# Patient Record
Sex: MALE | Race: WHITE | NOT HISPANIC OR LATINO | URBAN - METROPOLITAN AREA
[De-identification: names, ages, dates, MRNs, and addresses within clinical notes are randomized per-mention and may not be internally consistent; named-entity substitution may affect disease eponyms.]

---

## 2017-01-26 ENCOUNTER — EMERGENCY (EMERGENCY)
Facility: HOSPITAL | Age: 82
LOS: 0 days | Discharge: HOME | End: 2017-01-26
Admitting: EMERGENCY MEDICINE
Payer: COMMERCIAL

## 2017-01-26 DIAGNOSIS — S01.512A LACERATION WITHOUT FOREIGN BODY OF ORAL CAVITY, INITIAL ENCOUNTER: ICD-10-CM

## 2017-01-26 DIAGNOSIS — Z79.01 LONG TERM (CURRENT) USE OF ANTICOAGULANTS: ICD-10-CM
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
## 2017-01-26 DIAGNOSIS — J44.9 CHRONIC OBSTRUCTIVE PULMONARY DISEASE, UNSPECIFIED: ICD-10-CM

## 2017-01-26 DIAGNOSIS — I48.91 UNSPECIFIED ATRIAL FIBRILLATION: ICD-10-CM
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
## 2017-01-26 PROCEDURE — 99283 EMERGENCY DEPT VISIT LOW MDM: CPT

## 2017-04-20 ENCOUNTER — APPOINTMENT (OUTPATIENT)
Dept: PULMONOLOGY | Facility: CLINIC | Age: 82
End: 2017-04-20

## 2018-04-30 ENCOUNTER — APPOINTMENT (OUTPATIENT)
Dept: UROLOGY | Facility: CLINIC | Age: 83
End: 2018-04-30
Payer: MEDICARE

## 2018-04-30 VITALS
OXYGEN SATURATION: 98 % | DIASTOLIC BLOOD PRESSURE: 69 MMHG | WEIGHT: 185 LBS | HEIGHT: 76 IN | SYSTOLIC BLOOD PRESSURE: 152 MMHG | BODY MASS INDEX: 22.53 KG/M2 | TEMPERATURE: 97.5 F | HEART RATE: 66 BPM

## 2018-04-30 DIAGNOSIS — R35.0 FREQUENCY OF MICTURITION: ICD-10-CM

## 2018-04-30 DIAGNOSIS — K40.90 UNILATERAL INGUINAL HERNIA, W/OUT OBSTRUCTION OR GANGRENE, NOT SPECIFIED AS RECURRENT: ICD-10-CM

## 2018-04-30 DIAGNOSIS — N13.8 BENIGN PROSTATIC HYPERPLASIA WITH LOWER URINARY TRACT SYMPMS: ICD-10-CM

## 2018-04-30 DIAGNOSIS — N40.1 BENIGN PROSTATIC HYPERPLASIA WITH LOWER URINARY TRACT SYMPMS: ICD-10-CM

## 2018-04-30 PROCEDURE — 99213 OFFICE O/P EST LOW 20 MIN: CPT

## 2018-05-09 ENCOUNTER — APPOINTMENT (OUTPATIENT)
Dept: SURGERY | Facility: CLINIC | Age: 83
End: 2018-05-09
Payer: MEDICARE

## 2018-05-09 PROCEDURE — 99202 OFFICE O/P NEW SF 15 MIN: CPT

## 2018-05-30 ENCOUNTER — APPOINTMENT (OUTPATIENT)
Dept: DERMATOLOGY | Facility: CLINIC | Age: 83
End: 2018-05-30
Payer: MEDICARE

## 2018-05-30 DIAGNOSIS — Z86.79 PERSONAL HISTORY OF OTHER DISEASES OF THE CIRCULATORY SYSTEM: ICD-10-CM

## 2018-05-30 DIAGNOSIS — I87.2 VENOUS INSUFFICIENCY (CHRONIC) (PERIPHERAL): ICD-10-CM

## 2018-05-30 PROCEDURE — 99203 OFFICE O/P NEW LOW 30 MIN: CPT

## 2018-05-30 RX ORDER — FUROSEMIDE 40 MG/1
40 TABLET ORAL
Refills: 0 | Status: ACTIVE | COMMUNITY

## 2018-05-30 RX ORDER — SILODOSIN 8 MG/1
8 CAPSULE ORAL
Refills: 0 | Status: ACTIVE | COMMUNITY

## 2018-05-30 RX ORDER — LEVOFLOXACIN 250 MG/1
250 TABLET, FILM COATED ORAL
Refills: 0 | Status: ACTIVE | COMMUNITY

## 2018-05-30 RX ORDER — WARFARIN SODIUM 5 MG/1
5 TABLET ORAL
Refills: 0 | Status: ACTIVE | COMMUNITY

## 2018-05-30 RX ORDER — MONTELUKAST 10 MG/1
10 TABLET, FILM COATED ORAL
Refills: 0 | Status: ACTIVE | COMMUNITY

## 2018-05-30 RX ORDER — PREDNISONE 5 MG/1
5 TABLET ORAL
Refills: 0 | Status: ACTIVE | COMMUNITY

## 2018-06-18 ENCOUNTER — APPOINTMENT (OUTPATIENT)
Dept: DERMATOLOGY | Facility: CLINIC | Age: 83
End: 2018-06-18
Payer: MEDICARE

## 2018-06-18 DIAGNOSIS — I83.009 VARICOSE VEINS OF UNSPECIFIED LOWER EXTREMITY WITH ULCER OF UNSPECIFIED SITE: ICD-10-CM

## 2018-06-18 DIAGNOSIS — L97.909 VARICOSE VEINS OF UNSPECIFIED LOWER EXTREMITY WITH ULCER OF UNSPECIFIED SITE: ICD-10-CM

## 2018-06-18 PROCEDURE — 99213 OFFICE O/P EST LOW 20 MIN: CPT

## 2018-07-30 ENCOUNTER — APPOINTMENT (OUTPATIENT)
Dept: DERMATOLOGY | Facility: CLINIC | Age: 83
End: 2018-07-30

## 2018-07-31 ENCOUNTER — INPATIENT (INPATIENT)
Facility: HOSPITAL | Age: 83
LOS: 2 days | Discharge: NURSING, REHAB OR LONG-TERM CARE | End: 2018-08-03
Attending: SURGERY | Admitting: SURGERY
Payer: COMMERCIAL

## 2018-07-31 VITALS — WEIGHT: 184.97 LBS

## 2018-07-31 DIAGNOSIS — I50.30 UNSPECIFIED DIASTOLIC (CONGESTIVE) HEART FAILURE: ICD-10-CM

## 2018-07-31 DIAGNOSIS — Z98.49 CATARACT EXTRACTION STATUS, UNSPECIFIED EYE: Chronic | ICD-10-CM
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
## 2018-07-31 DIAGNOSIS — J44.9 CHRONIC OBSTRUCTIVE PULMONARY DISEASE, UNSPECIFIED: ICD-10-CM

## 2018-07-31 DIAGNOSIS — D64.9 ANEMIA, UNSPECIFIED: ICD-10-CM

## 2018-07-31 DIAGNOSIS — I10 ESSENTIAL (PRIMARY) HYPERTENSION: ICD-10-CM

## 2018-07-31 DIAGNOSIS — I48.91 UNSPECIFIED ATRIAL FIBRILLATION: ICD-10-CM

## 2018-07-31 LAB
ALBUMIN SERPL ELPH-MCNC: 2.6 G/DL — LOW (ref 3.3–5)
ALP SERPL-CCNC: 93 U/L — SIGNIFICANT CHANGE UP (ref 40–120)
ALT FLD-CCNC: 29 U/L — SIGNIFICANT CHANGE UP (ref 12–78)
ANION GAP SERPL CALC-SCNC: 7 MMOL/L — SIGNIFICANT CHANGE UP (ref 5–17)
ANISOCYTOSIS BLD QL: SIGNIFICANT CHANGE UP
APTT BLD: 29.8 SEC — SIGNIFICANT CHANGE UP (ref 27.5–37.4)
AST SERPL-CCNC: 24 U/L — SIGNIFICANT CHANGE UP (ref 15–37)
BASOPHILS # BLD AUTO: 0.07 K/UL — SIGNIFICANT CHANGE UP (ref 0–0.2)
BASOPHILS NFR BLD AUTO: 0.3 % — SIGNIFICANT CHANGE UP (ref 0–2)
BILIRUB SERPL-MCNC: 0.7 MG/DL — SIGNIFICANT CHANGE UP (ref 0.2–1.2)
BUN SERPL-MCNC: 50 MG/DL — HIGH (ref 7–23)
CALCIUM SERPL-MCNC: 8.4 MG/DL — LOW (ref 8.5–10.1)
CHLORIDE SERPL-SCNC: 106 MMOL/L — SIGNIFICANT CHANGE UP (ref 96–108)
CK SERPL-CCNC: 67 U/L — SIGNIFICANT CHANGE UP (ref 26–308)
CO2 SERPL-SCNC: 29 MMOL/L — SIGNIFICANT CHANGE UP (ref 22–31)
CREAT SERPL-MCNC: 1.78 MG/DL — HIGH (ref 0.5–1.3)
DACRYOCYTES BLD QL SMEAR: SLIGHT — SIGNIFICANT CHANGE UP
ELLIPTOCYTES BLD QL SMEAR: SIGNIFICANT CHANGE UP
EOSINOPHIL # BLD AUTO: 0.23 K/UL — SIGNIFICANT CHANGE UP (ref 0–0.5)
EOSINOPHIL NFR BLD AUTO: 1.1 % — SIGNIFICANT CHANGE UP (ref 0–6)
GLUCOSE SERPL-MCNC: 204 MG/DL — HIGH (ref 70–99)
HCT VFR BLD CALC: 28.5 % — LOW (ref 39–50)
HGB BLD-MCNC: 8.1 G/DL — LOW (ref 13–17)
HYPOCHROMIA BLD QL: SLIGHT — SIGNIFICANT CHANGE UP
IMM GRANULOCYTES NFR BLD AUTO: 1.4 % — SIGNIFICANT CHANGE UP (ref 0–1.5)
INR BLD: 1.61 RATIO — HIGH (ref 0.88–1.16)
LDH SERPL L TO P-CCNC: 298 U/L — HIGH (ref 84–241)
LYMPHOCYTES # BLD AUTO: 0.76 K/UL — LOW (ref 1–3.3)
LYMPHOCYTES # BLD AUTO: 3.6 % — LOW (ref 13–44)
MACROCYTES BLD QL: SLIGHT — SIGNIFICANT CHANGE UP
MANUAL SMEAR VERIFICATION: SIGNIFICANT CHANGE UP
MCHC RBC-ENTMCNC: 22.1 PG — LOW (ref 27–34)
MCHC RBC-ENTMCNC: 28.4 GM/DL — LOW (ref 32–36)
MCV RBC AUTO: 77.9 FL — LOW (ref 80–100)
MICROCYTES BLD QL: SIGNIFICANT CHANGE UP
MONOCYTES # BLD AUTO: 1.64 K/UL — HIGH (ref 0–0.9)
MONOCYTES NFR BLD AUTO: 7.8 % — SIGNIFICANT CHANGE UP (ref 2–14)
NEUTROPHILS # BLD AUTO: 18.01 K/UL — HIGH (ref 1.8–7.4)
NEUTROPHILS NFR BLD AUTO: 85.8 % — HIGH (ref 43–77)
NRBC # BLD: 0 /100 WBCS — SIGNIFICANT CHANGE UP (ref 0–0)
NT-PROBNP SERPL-SCNC: 3903 PG/ML — HIGH (ref 0–450)
PLAT MORPH BLD: NORMAL — SIGNIFICANT CHANGE UP
PLATELET # BLD AUTO: 600 K/UL — HIGH (ref 150–400)
POIKILOCYTOSIS BLD QL AUTO: SIGNIFICANT CHANGE UP
POTASSIUM SERPL-MCNC: 4.1 MMOL/L — SIGNIFICANT CHANGE UP (ref 3.5–5.3)
POTASSIUM SERPL-SCNC: 4.1 MMOL/L — SIGNIFICANT CHANGE UP (ref 3.5–5.3)
PROT SERPL-MCNC: 5.9 GM/DL — LOW (ref 6–8.3)
PROTHROM AB SERPL-ACNC: 17.6 SEC — HIGH (ref 9.8–12.7)
RBC # BLD: 3.66 M/UL — LOW (ref 4.2–5.8)
RBC # FLD: 18.9 % — HIGH (ref 10.3–14.5)
RBC BLD AUTO: ABNORMAL
SODIUM SERPL-SCNC: 142 MMOL/L — SIGNIFICANT CHANGE UP (ref 135–145)
TROPONIN I SERPL-MCNC: 0.03 NG/ML — SIGNIFICANT CHANGE UP (ref 0.01–0.04)
WBC # BLD: 21 K/UL — HIGH (ref 3.8–10.5)
WBC # FLD AUTO: 21 K/UL — HIGH (ref 3.8–10.5)

## 2018-07-31 PROCEDURE — 70450 CT HEAD/BRAIN W/O DYE: CPT | Mod: 26

## 2018-07-31 PROCEDURE — 74177 CT ABD & PELVIS W/CONTRAST: CPT | Mod: 26

## 2018-07-31 PROCEDURE — 99285 EMERGENCY DEPT VISIT HI MDM: CPT

## 2018-07-31 PROCEDURE — 73080 X-RAY EXAM OF ELBOW: CPT | Mod: 26,LT

## 2018-07-31 PROCEDURE — 73090 X-RAY EXAM OF FOREARM: CPT | Mod: 26,RT,76

## 2018-07-31 PROCEDURE — 93010 ELECTROCARDIOGRAM REPORT: CPT

## 2018-07-31 PROCEDURE — 72125 CT NECK SPINE W/O DYE: CPT | Mod: 26

## 2018-07-31 PROCEDURE — 99223 1ST HOSP IP/OBS HIGH 75: CPT

## 2018-07-31 PROCEDURE — 73060 X-RAY EXAM OF HUMERUS: CPT | Mod: 26,RT

## 2018-07-31 PROCEDURE — 73030 X-RAY EXAM OF SHOULDER: CPT | Mod: 26,RT

## 2018-07-31 PROCEDURE — 71260 CT THORAX DX C+: CPT | Mod: 26

## 2018-07-31 RX ORDER — PANTOPRAZOLE SODIUM 20 MG/1
40 TABLET, DELAYED RELEASE ORAL
Qty: 0 | Refills: 0 | Status: DISCONTINUED | OUTPATIENT
Start: 2018-07-31 | End: 2018-08-03

## 2018-07-31 RX ORDER — MORPHINE SULFATE 50 MG/1
4 CAPSULE, EXTENDED RELEASE ORAL ONCE
Qty: 0 | Refills: 0 | Status: DISCONTINUED | OUTPATIENT
Start: 2018-07-31 | End: 2018-07-31

## 2018-07-31 RX ORDER — LEVOTHYROXINE SODIUM 125 MCG
200 TABLET ORAL DAILY
Qty: 0 | Refills: 0 | Status: DISCONTINUED | OUTPATIENT
Start: 2018-07-31 | End: 2018-08-03

## 2018-07-31 RX ORDER — ACETAMINOPHEN 500 MG
650 TABLET ORAL EVERY 6 HOURS
Qty: 0 | Refills: 0 | Status: DISCONTINUED | OUTPATIENT
Start: 2018-07-31 | End: 2018-08-03

## 2018-07-31 RX ORDER — CEFTRIAXONE 500 MG/1
1000 INJECTION, POWDER, FOR SOLUTION INTRAMUSCULAR; INTRAVENOUS ONCE
Qty: 0 | Refills: 0 | Status: COMPLETED | OUTPATIENT
Start: 2018-07-31 | End: 2018-07-31

## 2018-07-31 RX ORDER — LIDOCAINE 4 G/100G
1 CREAM TOPICAL DAILY
Qty: 0 | Refills: 0 | Status: DISCONTINUED | OUTPATIENT
Start: 2018-07-31 | End: 2018-08-03

## 2018-07-31 RX ORDER — SODIUM CHLORIDE 9 MG/ML
500 INJECTION INTRAMUSCULAR; INTRAVENOUS; SUBCUTANEOUS ONCE
Qty: 0 | Refills: 0 | Status: COMPLETED | OUTPATIENT
Start: 2018-07-31 | End: 2018-07-31

## 2018-07-31 RX ORDER — ONDANSETRON 8 MG/1
4 TABLET, FILM COATED ORAL ONCE
Qty: 0 | Refills: 0 | Status: COMPLETED | OUTPATIENT
Start: 2018-07-31 | End: 2018-07-31

## 2018-07-31 RX ORDER — OXYCODONE HYDROCHLORIDE 5 MG/1
5 TABLET ORAL EVERY 6 HOURS
Qty: 0 | Refills: 0 | Status: DISCONTINUED | OUTPATIENT
Start: 2018-07-31 | End: 2018-08-02

## 2018-07-31 RX ORDER — ALBUTEROL 90 UG/1
2.5 AEROSOL, METERED ORAL EVERY 6 HOURS
Qty: 0 | Refills: 0 | Status: DISCONTINUED | OUTPATIENT
Start: 2018-07-31 | End: 2018-08-03

## 2018-07-31 RX ORDER — ASPIRIN/CALCIUM CARB/MAGNESIUM 324 MG
1 TABLET ORAL
Qty: 0 | Refills: 0 | COMMUNITY

## 2018-07-31 RX ORDER — HYDROMORPHONE HYDROCHLORIDE 2 MG/ML
0.5 INJECTION INTRAMUSCULAR; INTRAVENOUS; SUBCUTANEOUS EVERY 4 HOURS
Qty: 0 | Refills: 0 | Status: DISCONTINUED | OUTPATIENT
Start: 2018-07-31 | End: 2018-08-02

## 2018-07-31 RX ORDER — HEPARIN SODIUM 5000 [USP'U]/ML
5000 INJECTION INTRAVENOUS; SUBCUTANEOUS EVERY 12 HOURS
Qty: 0 | Refills: 0 | Status: DISCONTINUED | OUTPATIENT
Start: 2018-07-31 | End: 2018-08-02

## 2018-07-31 RX ORDER — WARFARIN SODIUM 2.5 MG/1
5 TABLET ORAL ONCE
Qty: 0 | Refills: 0 | Status: COMPLETED | OUTPATIENT
Start: 2018-07-31 | End: 2018-07-31

## 2018-07-31 RX ORDER — ONDANSETRON 8 MG/1
4 TABLET, FILM COATED ORAL EVERY 6 HOURS
Qty: 0 | Refills: 0 | Status: DISCONTINUED | OUTPATIENT
Start: 2018-07-31 | End: 2018-08-03

## 2018-07-31 RX ORDER — ALBUTEROL 90 UG/1
2.5 AEROSOL, METERED ORAL
Qty: 0 | Refills: 0 | Status: DISCONTINUED | OUTPATIENT
Start: 2018-07-31 | End: 2018-08-03

## 2018-07-31 RX ORDER — AMLODIPINE BESYLATE 2.5 MG/1
10 TABLET ORAL DAILY
Qty: 0 | Refills: 0 | Status: DISCONTINUED | OUTPATIENT
Start: 2018-07-31 | End: 2018-08-03

## 2018-07-31 RX ORDER — FUROSEMIDE 40 MG
40 TABLET ORAL DAILY
Qty: 0 | Refills: 0 | Status: DISCONTINUED | OUTPATIENT
Start: 2018-08-01 | End: 2018-08-01

## 2018-07-31 RX ORDER — FUROSEMIDE 40 MG
20 TABLET ORAL ONCE
Qty: 0 | Refills: 0 | Status: COMPLETED | OUTPATIENT
Start: 2018-07-31 | End: 2018-07-31

## 2018-07-31 RX ADMIN — SODIUM CHLORIDE 500 MILLILITER(S): 9 INJECTION INTRAMUSCULAR; INTRAVENOUS; SUBCUTANEOUS at 13:56

## 2018-07-31 RX ADMIN — MORPHINE SULFATE 4 MILLIGRAM(S): 50 CAPSULE, EXTENDED RELEASE ORAL at 12:15

## 2018-07-31 RX ADMIN — Medication 20 MILLIGRAM(S): at 18:22

## 2018-07-31 RX ADMIN — MORPHINE SULFATE 4 MILLIGRAM(S): 50 CAPSULE, EXTENDED RELEASE ORAL at 13:56

## 2018-07-31 RX ADMIN — HYDROMORPHONE HYDROCHLORIDE 0.5 MILLIGRAM(S): 2 INJECTION INTRAMUSCULAR; INTRAVENOUS; SUBCUTANEOUS at 17:00

## 2018-07-31 RX ADMIN — MORPHINE SULFATE 4 MILLIGRAM(S): 50 CAPSULE, EXTENDED RELEASE ORAL at 12:00

## 2018-07-31 RX ADMIN — ONDANSETRON 4 MILLIGRAM(S): 8 TABLET, FILM COATED ORAL at 12:00

## 2018-07-31 RX ADMIN — MORPHINE SULFATE 4 MILLIGRAM(S): 50 CAPSULE, EXTENDED RELEASE ORAL at 14:15

## 2018-07-31 RX ADMIN — Medication 650 MILLIGRAM(S): at 20:09

## 2018-07-31 RX ADMIN — SODIUM CHLORIDE 500 MILLILITER(S): 9 INJECTION INTRAMUSCULAR; INTRAVENOUS; SUBCUTANEOUS at 12:01

## 2018-07-31 RX ADMIN — OXYCODONE HYDROCHLORIDE 5 MILLIGRAM(S): 5 TABLET ORAL at 20:10

## 2018-07-31 RX ADMIN — HYDROMORPHONE HYDROCHLORIDE 0.5 MILLIGRAM(S): 2 INJECTION INTRAMUSCULAR; INTRAVENOUS; SUBCUTANEOUS at 16:45

## 2018-07-31 RX ADMIN — WARFARIN SODIUM 5 MILLIGRAM(S): 2.5 TABLET ORAL at 22:18

## 2018-07-31 RX ADMIN — CEFTRIAXONE 1000 MILLIGRAM(S): 500 INJECTION, POWDER, FOR SOLUTION INTRAMUSCULAR; INTRAVENOUS at 16:35

## 2018-07-31 RX ADMIN — PANTOPRAZOLE SODIUM 40 MILLIGRAM(S): 20 TABLET, DELAYED RELEASE ORAL at 19:22

## 2018-07-31 RX ADMIN — OXYCODONE HYDROCHLORIDE 5 MILLIGRAM(S): 5 TABLET ORAL at 20:40

## 2018-07-31 NOTE — ED ADULT NURSE NOTE - NSIMPLEMENTINTERV_GEN_ALL_ED
Implemented All Fall with Harm Risk Interventions:  [place] to call system. Call bell, personal items and telephone within reach. Instruct patient to call for assistance. Room bathroom lighting operational. Non-slip footwear when patient is off stretcher. Physically safe environment: no spills, clutter or unnecessary equipment. Stretcher in lowest position, wheels locked, appropriate side rails in place. Provide visual cue, wrist band, yellow gown, etc. Monitor gait and stability. Monitor for mental status changes and reorient to person, place, and time. Review medications for side effects contributing to fall risk. Reinforce activity limits and safety measures with patient and family. Provide visual clues: red socks.

## 2018-07-31 NOTE — H&P ADULT - NSHPLABSRESULTS_GEN_ALL_CORE
Vital Signs Last 24 Hrs  T(C): 36.4 (31 Jul 2018 10:41), Max: 36.4 (31 Jul 2018 10:41)  T(F): 97.5 (31 Jul 2018 10:41), Max: 97.5 (31 Jul 2018 10:41)  HR: 92 (31 Jul 2018 10:41) (92 - 92)  BP: 134/96 (31 Jul 2018 10:41) (134/96 - 134/96)  BP(mean): --  RR: 17 (31 Jul 2018 10:41) (17 - 17)  SpO2: 100% (31 Jul 2018 10:41) (100% - 100%)    Labs:  CARDIAC MARKERS ( 31 Jul 2018 11:50 )  0.026 ng/mL / x     / 67 U/L / x     / x                           8.1    21.00 )-----------( 600      ( 31 Jul 2018 11:50 )             28.5   CBC Full  -  ( 31 Jul 2018 11:50 )  WBC Count : 21.00 K/uL  Hemoglobin : 8.1 g/dL  Hematocrit : 28.5 %  Platelet Count - Automated : 600 K/uL  Mean Cell Volume : 77.9 fl  Mean Cell Hemoglobin : 22.1 pg  Mean Cell Hemoglobin Concentration : 28.4 gm/dL  Auto Neutrophil # : 18.01 K/uL  Auto Lymphocyte # : 0.76 K/uL  Auto Monocyte # : 1.64 K/uL  Auto Eosinophil # : 0.23 K/uL  Auto Basophil # : 0.07 K/uL  Auto Neutrophil % : 85.8 %  Auto Lymphocyte % : 3.6 %  Auto Monocyte % : 7.8 %  Auto Eosinophil % : 1.1 %  Auto Basophil % : 0.3 %  142  |  106  |  50<H>  ----------------------------<  204<H>  4.1   |  29  |  1.78<H>  Ca    8.4<L>      31 Jul 2018 11:50  TPro  5.9<L>  /  Alb  2.6<L>  /  TBili  0.7  /  DBili  x   /  AST  24  /  ALT  29  /  AlkPhos  93  07-31  LIVER FUNCTIONS - ( 31 Jul 2018 11:50 )  Alb: 2.6 g/dL / Pro: 5.9 gm/dL / ALK PHOS: 93 U/L / ALT: 29 U/L / AST: 24 U/L / GGT: x         PT/INR - ( 31 Jul 2018 11:50 )   PT: 17.6 sec;   INR: 1.61 ratio    PTT - ( 31 Jul 2018 11:50 )  PTT:29.8 sec    Radiology:    EXAM:  CT ABDOMEN AND PELVIS IC                        EXAM:  CT CHEST IC                       *** ADDENDUM 07/31/2018  ***  There is a left inguinal hernia containing nonobstructed sigmoid colon.   Colonic diverticulosis is noted, without diverticulitis.  *** END OF ADDENDUM 07/31/2018  ***  PROCEDURE DATE:  07/31/2018    INTERPRETATION:  Clinical information: Status post fall 2 days ago with   back pain. On Coumadin.  COMPARISON: None  IMPRESSION:   Acute fractures of the L4 vertebral body and right L5 transverse process.   Suspicion for nondisplaced right fourth rib fracture.  These findings   were discussed with LAURIE Zheng on 7.31.18, 1430 hours.  Small loculated right pleural effusion. Partial right lower lobe   atelectasis.  Mild pulmonary edema.  1.4 cm area of focal gallbladder wall thickening could be due to   adenomyomatosis versus neoplasm.  ***Please see the addendum at the top of this report. It may contain   additional important information or changes.****      EXAM:  CT CERVICAL SPINE                        PROCEDURE DATE:  07/31/2018    INTERPRETATION:    CT cervical spine without IV contrast      IMPRESSION:   No vertebral fracture is recognized.  Multilevel   degenerative disc disease and spondylosis at C2-3 through C7-T1   withnarrowing of the LEFT C2-3, LEFT C3-4, BILATERAL C4-5, C5-6 and RIGHT   C6-7 neural foramina due to uncovertebral spurring and facet osteophytic   hypertrophy. Degenerative cord impingement is seen at C4-5.      EXAM:  CT BRAIN                        PROCEDURE DATE:  07/31/2018    INTERPRETATION:    CT head without IV contrast      CLINICAL INFORMATION:  Fall 2 days ago   Intracranial hemorrhage.  IMPRESSION:   Mild periventricular white matter ischemia.               EXAM:  FOREARM-ULNA & RADIUS-RIGHT                        PROCEDURE DATE:  07/31/2018    IMPRESSION:  No acute fracture or dislocation.    EXAM:  SHOULDER COMP  MIN 2 VIEWS-RT                        EXAM:  HUMERUS-RIGHT                        PROCEDURE DATE:  07/31/2018    IMPRESSION:  No acute fracture or dislocation.

## 2018-07-31 NOTE — ED STATDOCS - NS_ ATTENDINGSCRIBEDETAILS _ED_A_ED_FT
I, Jose Rafael Peters DO, performed the initial face to face bedside interview with this patient regarding history of present illness and determined that the patient should be seen in the main ED.  The history, was documented by the scribe in my presence and I attest to the accuracy of the documentation.

## 2018-07-31 NOTE — ED STATDOCS - PROGRESS NOTE DETAILS
Juana ANGEL for ED attending, Dr. Peters: 89 y/o M with a PMHx of A-Fib (on Coumadin), HTN, COPD, Hernia, Hypothyroidism, and Kidney Disease presenting to the ED with son s/p fall two nights ago. Pt fell onto floor and was laying on floor for four hours. Reports that he could not reach his Med Alert band to call for help. Denies LOC. Presents with abrasions to arm and head with bruising. C/o back pain raditing to right shoulder and inability to ambulate. Denies confusion and son states pt is acting at baseline. No CP. Pt did not take Coumadin last night. Today is first day pt has sought medical care s/p fall. No hx of DM. PMD: Dr. Burgess. Will send patient to main ED for further evaluation.

## 2018-07-31 NOTE — H&P ADULT - NSHPPHYSICALEXAM_GEN_ALL_CORE
GCS of 15  Airway is patent  Breathing is symmetric and unlabored  CNII-XII grossly intact  HEENT: Normocephalic, atraumatic, SPENCER, EOM wnl, no otorrhea or hemotympanum b/l, no epistaxis or d/c b/l nares, (+) left frontal scalp ecchymosis, no craniofacial bony pathology or tenderness b/l otherwise  Neck: Soft and supple, nontender to passive/active ROM exam. No crepitus, no ecchymosis, no hematoma, to exam, no JVD, no tracheal deviation  Cspine/thoracolumbrosacral spine: (+) L4-L5 tenderness from known fracture pathology, no gross bony pathology or tenderness to exam otherwise  Cardiovascular: S1S2 Present  Chest: no gross left rib pathology or tenderness to exam. (+) tenderness to right 4th rib region from known fracture pathology. No sternal pathology or tenderness to exam. No crepitus, no ecchymosis, no hematoma. No penetrating thorcoabdominal trauma  Respiratory: Rate is 18; Respiratory Effort normal; no wheezes, rales or rhonchi to exam  ABD: bowel sounds (+), soft, nontender, non distended, no rebound, no guarding, no rigidity, no skin changes to exam. No pelvic instability to exam, no skin changes  Genitourinary: No scrotal/perineal/perirectal hematoma/ecchymosis/tenderness to exam. (+) left inguinal hernia, reducible, nontender  External genitalia: normal, no blood at urethral meatus  Musculoskeletal: Pt has palpable b/l radial, femoral, dorsalis pedis pulses. All digits are warm and well perfused. No gross long bone pathology or tenderness to exam. Pt demonstrates grossly intact sensoromotor function. Pt has good capillary refill to digits, 4+ pitting edema to lower leg exam.  Skin: (+) ecchymosis to right upper extremity, right chest wall, right flank region, no lesions or rashes to exam otherwise

## 2018-07-31 NOTE — ED PROVIDER NOTE - SKIN, MLM
diffuse ecchymosis R arm. skin tear on lateral L forearm and elbow 8cm long 3cm wide. periorbital ecchymosis on L eye. L frontal bruising

## 2018-07-31 NOTE — ED STATDOCS - PMH
Atrial fibrillation    Diastolic heart failure    Hypertension    Hypothyroidism    Kidney disease  since 1 year  Occult GI bleeding

## 2018-07-31 NOTE — H&P ADULT - HISTORY OF PRESENT ILLNESS
Trauma Consult    Pt s.p mechanical fall from standing height at home where he lives alone, on 7/29/18. Pt remained on the floor for several hours prior to being assisted by his son. Pt did not seek medical attention until 7/31/18. Pt found to have lumbar fractures, right rib fracture, correlating to region of pain complaints    Pt seen and examined at bedside with chaperone. Pt is AAOx3, pt in no acute distress. Pt denied c/o fever, chills, chest pain, SOB, abd pain, N/V/D, extremity pain or dysfunction, hemoptysis, hematemesis, hematuria, hematochexia, headache, diplopia, vertigo, dizzyness. Pt stated tolerating diet, (+) void, (+) ambulation, (+) bowel function

## 2018-07-31 NOTE — CONSULT NOTE ADULT - SUBJECTIVE AND OBJECTIVE BOX
PCP- DR Aditya Small    CC- s/p mechanical fall 2 days ago    HPI:  89yo/M with PMH afib on coumadin, CKD with unknown baseline creatinine, chr diastolic CHF, COPD on chronic prednisone, BPH presented as trauma after he sustained fall 2 days ago and was unable to get up and ambulate after that. Patient states that  night he tripped over the blanket and fell, he could not reach alert system to call for help and was on the floor until the morning. Yesterday, he had a lot of back pain and could not move, so today he came in to the hospital. C/o RT shoulder pain and back pain. Admitted to trauma, medical consult called for medical co-management    PMH- as above  PSH- denies  Soc hx- denies smoking, lives at home, walks with walker  Fam hx- both parents are     18- pt c/o RT shoulder and back pain    Review of system- All 10 systems reviewed and is as per HPI otherwise negative.     T(C): 36.4 (18 @ 10:41), Max: 36.4 (18 @ 10:41)  HR: 98 (18 @ 16:42) (92 - 98)  BP: 146/52 (18 @ 16:42) (134/96 - 146/52)  RR: 18 (18 @ 16:42) (17 - 18)  SpO2: 100% (18 @ 16:42) (100% - 100%)  Wt(kg): --    LABS:                        8.1    21.00 )-----------( 600      ( 2018 11:50 )             28.5         142  |  106  |  50<H>  ----------------------------<  204<H>  4.1   |  29  |  1.78<H>    Ca    8.4<L>      2018 11:50  TPro  5.9<L>  /  Alb  2.6<L>  /  TBili  0.7  /  DBili  x   /  AST  24  /  ALT  29  /  AlkPhos  93    PT/INR - ( 2018 11:50 )   PT: 17.6 sec;   INR: 1.61 ratio     PTT - ( 2018 11:50 )  PTT:29.8 sec    CARDIAC MARKERS ( 2018 11:50 )  0.026 ng/mL / x     / 67 U/L / x     / x        RADIOLOGY & ADDITIONAL TESTS:  EXAM:  CT ABDOMEN AND PELVIS IC                        EXAM:  CT CHEST IC                        *** ADDENDUM 2018  ***  There is a left inguinal hernia containing nonobstructed sigmoid colon.   Colonic diverticulosis is noted, without diverticulitis.  *** END OF ADDENDUM 2018  **  PROCEDURE DATE:  2018    INTERPRETATION:  Clinical information: Status post fall 2 days ago with   back pain. On Coumadin.    COMPARISON: None    PROCEDURE:   CT of the Chest,Abdomen and Pelvis was performed with intravenous   contrast.   Imaging was performed through the chest in the arterial phase followed by   imaging of the abdomen and pelvis in the portal venous phase.  Intravenous contrast: 90 ml Omnipaque 350. 10 ml discarded.  Oral contrast:None.  Sagittal and coronal reformats were performed.    FINDINGS:    CHEST:     LOWER NECK: Within normal limits.  AXILLA, MEDIASTINUM AND DELMIS: No lymphadenopathy.  VESSELS: Atherosclerotic arterial calcifications, including the coronary   arteries.  HEART: The heart is enlarged.No pericardial effusion.  PLEURA: Small right pleural effusion, partially loculated with mild   associated pleural thickening, likely indicating chronic effusion. No   pneumothorax.  LUNGS ANDLARGE AIRWAYS: Patent central airways. No nodule, mass or   evidence of contusion. Mild apical septal thickening and groundglass   opacity compatible with mild edema.  CHEST WALL:  Unremarkable    ABDOMEN AND PELVIS:    LIVER: Left hepatic lobe cyst.Scattered subcentimeter hypodense lesions   which are too small to characterize.  SPLEEN: Within normal limits.  PANCREAS: Within normal limits.  GALLBLADDER: Cholelithiasis. 1.4 cm segment of focal wall thickening near   the fundus.  BILE DUCTS: Normal caliber.  ADRENALS: Within normal limits.  KIDNEYS/URETERS: Left renal cyst and 1.4 cm upper pole lesion which is   not well characterized due to beam hardening artifact from the patient's   arms being at his side. No hydronephrosis or stone.    RETROPERITONEUM: No upper abdominal, retroperitoneal or pelvic   lymphadenopathy.    VESSELS:  Atherosclerotic arterial calcifications. Normal caliber aorta.    BOWEL: No bowel obstruction, wall thickening or inflammatory change.   Appendix not identified.  PERITONEUM: No ascites, hemorrhage or pneumoperitoneum.    REPRODUCTIVE ORGANS: Markedly enlarged prostate.  BLADDER: Within normal limits    ABDOMINAL WALL: Within normal limits.  BONES: Acute fracture of the L4 vertebral body with mild depression of   the superior endplate and no retropulsion. Acute nondisplaced fracture of   the right L5 transverse process at its junction with the vertebral body.   Lucency and mild cortical step off of the anterolateral right fourth rib   suspicious for fracture.    IMPRESSION:     Acute fractures of the L4 vertebral body and right L5 transverse process.   Suspicion for nondisplaced right fourth rib fracture.  These findings   were discussed with LAURIE Zheng on .18, 1430 hours.  Small loculated right pleural effusion. Partial right lower lobe   atelectasis.  Mild pulmonary edema.  1.4 cm area of focal gallbladder wall thickening could be due to   adenomyomatosis versus neoplasm.    EXAM:  CT CERVICAL SPINE                        PROCEDURE DATE:  2018    INTERPRETATION:      CT cervical spine without IV contrast        CLINICAL INFORMATION: Fracture, trauma, neck pain.  Neck pain, spinal   stenosis, spondylosis. s/p fall 2 days ago on coumadin s/p fall 2 days ago   on coumadin    TECHNIQUE:  Contiguous axial 2.0 mm sections were obtained through the   cervical spine using a single helical acquisition.  Additional 2 mm   sagittal and coronal reformatted reconstructions of the spine were   obtained.  These additional reformatted images were used to evaluate the   spine for alignment, vertebral fractures and the integrity of the the   posterior elements.   This scan was performed using automatic exposure   control (radiation dose reduction software) to obtain a diagnostic image   quality scan with patient dose as low as reasonably achievable.        FINDINGS:   No prior similar studies are available for review    Cervical vertebral body heights are maintained. No vertebral fracture is   seen. No destructive bone lesion is found.  Alignment is preserved. There   is an osseous fusion extending from C5 through C7.  Facet joints appear   intact and aligned.    Cervical intervertebral disc spaces show multilevel degenerative disc   disease and spondylosis at C2-3 through C7-T1 with loss of disc height   and associated degenerative endplate changes. There is narrowing of the   LEFT C2-3, LEFT C3-4, BILATERAL C4-5, C5-6 and RIGHT C6-7 neural foramina   due to uncovertebral spurring and facet osteophytic hypertrophy.   Degenerative cord impingement is seen at C4-5.   MR would be required to   evaluate the intervertebral discs at higher sensitivity for disc   pathology.    The skull base appears intact.  No neck mass is recognized.  Paraspinal   soft tissues appear intact. Visualized lymph nodes appear to be within   physiologic size limits.           IMPRESSION:   No vertebral fracture is recognized.  Multilevel   degenerative disc disease and spondylosis at C2-3 through C7-T1   with narrowing of the LEFT C2-3, LEFT C3-4, BILATERAL C4-5, C5-6 and RIGHT   C6-7 neural foramina due to uncovertebral spurring and facet osteophytic   hypertrophy. Degenerative cord impingement is seen at C4-5.      PHYSICAL EXAM:  GENERAL: NAD, well-groomed, well-developed  HEAD:  Normocephalic, multiple bruises  EYES: EOMI, PERRLA, conjunctiva and sclera clear  HEENT: Moist mucous membranes  NECK: Supple, No JVD  NERVOUS SYSTEM:  Alert & Oriented X3, Motor Strength 5/5 B/L upper and lower extremities; DTRs 2+ intact and symmetric  CHEST/LUNG: Clear to auscultation bilaterally; No rales, rhonchi, wheezing, or rubs  HEART: Irregular rate and rhythm; No murmurs, rubs, or gallops  ABDOMEN: Soft, Nontender, Nondistended; Bowel sounds present  GENITOURINARY- Voiding, no palpable bladder  EXTREMITIES:  2+ Peripheral Pulses, No clubbing, cyanosis, or edema  MUSCULOSKELTAL- RT shoulder swelling and superficial bruises.   SKIN-no rash, no lesion  CNS- alert, oriented X3, non focal     MEDICATIONS  (STANDING):  amLODIPine   Tablet 10 milliGRAM(s) Oral daily  heparin  Injectable 5000 Unit(s) SubCutaneous every 12 hours  levothyroxine 200 MICROGram(s) Oral daily  lidocaine   Patch 1 Patch Transdermal daily  multivitamin 1 Tablet(s) Oral daily  pantoprazole    Tablet 40 milliGRAM(s) Oral before breakfast  predniSONE   Tablet 10 milliGRAM(s) Oral two times a day    MEDICATIONS  (PRN):  acetaminophen   Tablet 650 milliGRAM(s) Oral every 6 hours PRN For Temp greater than 38 C (100.4 F)  HYDROmorphone  Injectable 0.5 milliGRAM(s) IV Push every 4 hours PRN Severe Pain (7 - 10)  ondansetron Injectable 4 milliGRAM(s) IV Push every 6 hours PRN Nausea  oxyCODONE    IR 5 milliGRAM(s) Oral every 6 hours PRN Moderate Pain (4 - 6)    Assessment/Plan  #S/p mechanical fall with L4 compression fracture/L5 transverse process fracture/?rib fracture  Trauma eval appreciated  Pain meds prn  Spine eval  Lidoderm patch  PT eval after seen by spine  incentive spirometry  Bowel regimen    #Anemia/ leukocytosis/thrombocytosis  unclear etiology, previous CBC in 2016 is normal  Will repeat labs in am  Iron panel  Does not look toxic- s/p one dose of IV Rocephin. Would hold off further antibiotics as no clear source of infection yet found    #COPD on chr prednisone  Cont maint dose of Prednisone  Inhalers prn    #Chr afib on coumadin  Cont coumadin, dose to keep INR therapeutic    #Likely CKD  Previous CR back in 2016  Monitor BMP    #Chr diastolic CHF  Imaging studies show mild pulm congestion. Clinically patient looks compensated  WIll resume oral Lasix at 40mg daily and monitor the labs    #BPH- monitor for voiding difficulties    #Dispo- thank you for consult, will follow with you. Time spent 70mins

## 2018-07-31 NOTE — H&P ADULT - ASSESSMENT
A/P:  L4 vertebral body fractree  L5 transverse process fracture  Right 4th rib fracture  S/P fall at home  Medical comorbidities of Atrial fibrillation, Chronic back pain, COPD, Diastolic heart failure, Hypertension, Hypothyroidism, Kidney disease, chronic left inguinal hernia  Cardiology consult  Hospitalist consult for medical management  Spine surgery consult  GI/DVT prophylaxis  Pain control  Incentive spirometry  Serial abd exams  F/U labs, radiologic studies  Fall risk protocol  Pt will be monitored for signs of evolution/resolution of pathology and surgical intervention as required and warranted  Pt aware of and agrees with all of the above

## 2018-07-31 NOTE — ED PROVIDER NOTE - PMH
Atrial fibrillation    Chronic back pain    COPD (chronic obstructive pulmonary disease)    Diastolic heart failure    Hypertension    Hypothyroidism    Kidney disease  since 1 year  Occult GI bleeding

## 2018-07-31 NOTE — ED ADULT TRIAGE NOTE - CHIEF COMPLAINT QUOTE
pt son states that pt fell on sunday, was on floor for unknown amount of time, pt has abrasions to arm  and head with brusiing, pt denies LOC at time of fall, tp c/o back pain and inability to ambulate. pt denies confusion, son states pt is acting at baseline

## 2018-07-31 NOTE — ED PROVIDER NOTE - CARE PLAN
Principal Discharge DX:	Fracture of spine, lumbar, without spinal cord injury, closed, initial encounter

## 2018-07-31 NOTE — PATIENT PROFILE ADULT. - VISION (WITH CORRECTIVE LENSES IF THE PATIENT USUALLY WEARS THEM):
Normal vision: sees adequately in most situations; can see medication labels, newsprint/wears glasses for reading and distance

## 2018-07-31 NOTE — ED ADULT NURSE NOTE - OBJECTIVE STATEMENT
Pt states he fell 2 days ago onto wood floor. He also states his right arm was twisted underneath him. Pt states he called his son who picked him up off the floor. Pt c/o severe back and right shoulder pain. Pt has left forehead abrasion, 8x3cm left arm skin tear , right flank ecchymosis and multiple ecchymotic areas arms and legs. Left arm skin tear cleansed and dressing applied

## 2018-07-31 NOTE — ED PROVIDER NOTE - OBJECTIVE STATEMENT
89 y/o male with a PMHx of COPD on Prednisone, HTN, AFib, chronic back pain, Hypothyroidism, Kidney disease presents to the ED s/p pt was walking from one week to another and he had a blanket on his head and he fell. Denies LOC. +middle lower back pain. Pt has been using lidocaine patches regularly. Pt lives alone. 89 y/o male with a PMHx of COPD on Prednisone, HTN, AFib, chronic back pain, Hypothyroidism, Kidney disease presents to the ED s/p fall 2 nights ago. Pt reports he was walking from one room to another with a blanket on his head and he fell. Denies LOC. +middle lower back pain. Pt has been using lidocaine patches regularly for chronic back pain. Pt lives alone.

## 2018-07-31 NOTE — CONSULT NOTE ADULT - PROBLEM SELECTOR RECOMMENDATION 2
controlled ventricular rate , was not any negative chronotropic at home ,  currrently controlled , , resume warfarin to maintain INR 2-3 provided his he is not bleeding ,

## 2018-07-31 NOTE — CONSULT NOTE ADULT - PROBLEM SELECTOR RECOMMENDATION 3
clinically no recent worsening of SOB , however patient abnormal CT chest showing pulmonary edema , LE Edema , give one dose IV lasix , continue his regular home dose 40 mg po BID ,

## 2018-07-31 NOTE — ED PROVIDER NOTE - CARDIAC, MLM
Normal rate, regular rhythm.  Heart sounds S1, S2.  No murmurs, rubs or gallops. B/L lower extremity edema.

## 2018-07-31 NOTE — PROVIDER CONTACT NOTE (OTHER) - SITUATION
Dr Garza requested Dr Sunshine come evaluate the patient for cardio management from a trauma stand point. I spoke with Dr Sunshine and he will come evaluate today.

## 2018-07-31 NOTE — CONSULT NOTE ADULT - SUBJECTIVE AND OBJECTIVE BOX
REASON FOR CONSULT: s/p fall     CHIEF COMPLAINT: fall     HPI: 89 year old male with hx of chronic COPD on home oxygen , chronic atrial fibrillation , Hypertension . chronic Lower extremity edema  who accidentally fell at home last night , on 7/29/18. Pt remained on the floor for several hours prior to being assisted by his son. Pt did not seek medical attention until 7/31/18. Pt found to have lumbar fractures, right rib fracture, correlating to region of pain complaints    Pt seen and examined at bedside with chaperone. Pt is AAOx3, pt in no acute distress. Pt denied c/o fever, chills, chest pain, SOB, abd pain, N/V/D, extremity pain or dysfunction, hemoptysis, hematemesis, hematuria, hematochexia, headache, diplopia, vertigo, dizzyness. Pt stated tolerating diet, (+) void, (+) ambulation, (+) bowel function (31 Jul 2018 15:30)  patient says his breathing is same as before , does have chronic LE edema , patient sits most of the day , decreased ambulation due to back pain that got worse over last few months       PAST MEDICAL & SURGICAL HISTORY:  Chronic back pain  COPD (chronic obstructive pulmonary disease)  Kidney disease: since 1 year  Hypothyroidism  Occult GI bleeding  Atrial fibrillation  Diastolic heart failure  Hypertension  History of cataract extraction, unspecified laterality      Allergies    No Known Allergies    Intolerances        SOCIAL HISTORY: former smoker     FAMILY HISTORY:  Family history unknown      MEDICATIONS:  MEDICATIONS  (STANDING):  amLODIPine   Tablet 10 milliGRAM(s) Oral daily  heparin  Injectable 5000 Unit(s) SubCutaneous every 12 hours  levothyroxine 200 MICROGram(s) Oral daily  lidocaine   Patch 1 Patch Transdermal daily  multivitamin 1 Tablet(s) Oral daily  pantoprazole    Tablet 40 milliGRAM(s) Oral before breakfast  predniSONE   Tablet 10 milliGRAM(s) Oral two times a day    MEDICATIONS  (PRN):  acetaminophen   Tablet 650 milliGRAM(s) Oral every 6 hours PRN For Temp greater than 38 C (100.4 F)  HYDROmorphone  Injectable 0.5 milliGRAM(s) IV Push every 4 hours PRN Severe Pain (7 - 10)  ondansetron Injectable 4 milliGRAM(s) IV Push every 6 hours PRN Nausea  oxyCODONE    IR 5 milliGRAM(s) Oral every 6 hours PRN Moderate Pain (4 - 6)      REVIEW OF SYSTEMS:    ecchymosis , back pain   other wise All other review of systems is negative unless indicated above    Vital Signs Last 24 Hrs  T(C): 36.4 (31 Jul 2018 10:41), Max: 36.4 (31 Jul 2018 10:41)  T(F): 97.5 (31 Jul 2018 10:41), Max: 97.5 (31 Jul 2018 10:41)  HR: 98 (31 Jul 2018 16:42) (92 - 98)  BP: 146/52 (31 Jul 2018 16:42) (134/96 - 146/52)  BP(mean): --  RR: 18 (31 Jul 2018 16:42) (17 - 18)  SpO2: 100% (31 Jul 2018 16:42) (100% - 100%)    I&O's Summary    31 Jul 2018 07:01  -  31 Jul 2018 17:10  --------------------------------------------------------  IN: 500 mL / OUT: 0 mL / NET: 500 mL        PHYSICAL EXAM:    Constitutional: NAD, awake and alert, well-developed  HEENT: PERR, EOMI,  No oral cyananosis.  Neck:  supple,  No JVD  Respiratory: Breath sounds are clear bilaterally, No wheezing,scattered basal crackles i  Cardiovascular: S1 and S2,IR IR   Gastrointestinal: Bowel Sounds present, soft, nontender.   Extremities: 3 plus chronic venous stasis edema  Caleb  Vascular: 2+ peripheral pulses  Neurological: A/O x 3, no focal deficits  Musculoskeletal: no calf tenderness.  Skin: eccymosis , chronic stasis edema , ulceration       LABS: All Labs Reviewed:                        8.1    21.00 )-----------( 600      ( 31 Jul 2018 11:50 )             28.5     31 Jul 2018 11:50    142    |  106    |  50     ----------------------------<  204    4.1     |  29     |  1.78     Ca    8.4        31 Jul 2018 11:50    TPro  5.9    /  Alb  2.6    /  TBili  0.7    /  DBili  x      /  AST  24     /  ALT  29     /  AlkPhos  93     31 Jul 2018 11:50    PT/INR - ( 31 Jul 2018 11:50 )   PT: 17.6 sec;   INR: 1.61 ratio         PTT - ( 31 Jul 2018 11:50 )  PTT:29.8 sec  CARDIAC MARKERS ( 31 Jul 2018 11:50 )  0.026 ng/mL / x     / 67 U/L / x     / x          Blood Culture:   07-31 @ 11:50  Pro Bnp [id number]        RADIOLOGY/EKG: Atrail fibrillation left AXIS IVCD    < from: CT Chest w/ IV Cont (07.31.18 @ 13:27) >  IMPRESSION:     Acute fractures of the L4 vertebral body and right L5 transverse process.   Suspicion for nondisplaced right fourth rib fracture.  These findings   were discussed with LAURIE Zheng on 7.31.18, 1430 hours.    Small loculated right pleural effusion. Partial right lower lobe   atelectasis.    Mild pulmonary edema.    1.4 cm area of focal gallbladder wall thickening could be due to   adenomyomatosis versus neoplasm.        ***Please see the addendum at the top of this report. It may contain   additional important information or changes.****    < end of copied text >

## 2018-08-01 DIAGNOSIS — S32.009A UNSPECIFIED FRACTURE OF UNSPECIFIED LUMBAR VERTEBRA, INITIAL ENCOUNTER FOR CLOSED FRACTURE: ICD-10-CM

## 2018-08-01 LAB
ANION GAP SERPL CALC-SCNC: 8 MMOL/L — SIGNIFICANT CHANGE UP (ref 5–17)
APPEARANCE UR: CLEAR — SIGNIFICANT CHANGE UP
BASOPHILS # BLD AUTO: 0.03 K/UL — SIGNIFICANT CHANGE UP (ref 0–0.2)
BASOPHILS NFR BLD AUTO: 0.2 % — SIGNIFICANT CHANGE UP (ref 0–2)
BILIRUB UR-MCNC: NEGATIVE — SIGNIFICANT CHANGE UP
BLD GP AB SCN SERPL QL: SIGNIFICANT CHANGE UP
BUN SERPL-MCNC: 50 MG/DL — HIGH (ref 7–23)
CALCIUM SERPL-MCNC: 8.3 MG/DL — LOW (ref 8.5–10.1)
CHLORIDE SERPL-SCNC: 108 MMOL/L — SIGNIFICANT CHANGE UP (ref 96–108)
CO2 SERPL-SCNC: 28 MMOL/L — SIGNIFICANT CHANGE UP (ref 22–31)
COLOR SPEC: YELLOW — SIGNIFICANT CHANGE UP
CREAT SERPL-MCNC: 1.88 MG/DL — HIGH (ref 0.5–1.3)
DIFF PNL FLD: NEGATIVE — SIGNIFICANT CHANGE UP
EOSINOPHIL # BLD AUTO: 0.19 K/UL — SIGNIFICANT CHANGE UP (ref 0–0.5)
EOSINOPHIL NFR BLD AUTO: 1.1 % — SIGNIFICANT CHANGE UP (ref 0–6)
FERRITIN SERPL-MCNC: 40 NG/ML — SIGNIFICANT CHANGE UP (ref 30–400)
GLUCOSE SERPL-MCNC: 91 MG/DL — SIGNIFICANT CHANGE UP (ref 70–99)
GLUCOSE UR QL: NEGATIVE MG/DL — SIGNIFICANT CHANGE UP
HCT VFR BLD CALC: 26.9 % — LOW (ref 39–50)
HCT VFR BLD CALC: 30.7 % — LOW (ref 39–50)
HGB BLD-MCNC: 7.4 G/DL — LOW (ref 13–17)
HGB BLD-MCNC: 8.4 G/DL — LOW (ref 13–17)
IMM GRANULOCYTES NFR BLD AUTO: 0.9 % — SIGNIFICANT CHANGE UP (ref 0–1.5)
INR BLD: 1.45 RATIO — HIGH (ref 0.88–1.16)
IRON SATN MFR SERPL: 14 UG/DL — LOW (ref 45–165)
IRON SATN MFR SERPL: 5 % — LOW (ref 16–55)
KETONES UR-MCNC: NEGATIVE — SIGNIFICANT CHANGE UP
LEUKOCYTE ESTERASE UR-ACNC: NEGATIVE — SIGNIFICANT CHANGE UP
LYMPHOCYTES # BLD AUTO: 0.91 K/UL — LOW (ref 1–3.3)
LYMPHOCYTES # BLD AUTO: 5.5 % — LOW (ref 13–44)
MCHC RBC-ENTMCNC: 21.7 PG — LOW (ref 27–34)
MCHC RBC-ENTMCNC: 27.5 GM/DL — LOW (ref 32–36)
MCV RBC AUTO: 78.9 FL — LOW (ref 80–100)
MONOCYTES # BLD AUTO: 1.65 K/UL — HIGH (ref 0–0.9)
MONOCYTES NFR BLD AUTO: 9.9 % — SIGNIFICANT CHANGE UP (ref 2–14)
NEUTROPHILS # BLD AUTO: 13.75 K/UL — HIGH (ref 1.8–7.4)
NEUTROPHILS NFR BLD AUTO: 82.4 % — HIGH (ref 43–77)
NITRITE UR-MCNC: NEGATIVE — SIGNIFICANT CHANGE UP
NRBC # BLD: 0 /100 WBCS — SIGNIFICANT CHANGE UP (ref 0–0)
PH UR: 5 — SIGNIFICANT CHANGE UP (ref 5–8)
PLATELET # BLD AUTO: 476 K/UL — HIGH (ref 150–400)
POTASSIUM SERPL-MCNC: 4.5 MMOL/L — SIGNIFICANT CHANGE UP (ref 3.5–5.3)
POTASSIUM SERPL-SCNC: 4.5 MMOL/L — SIGNIFICANT CHANGE UP (ref 3.5–5.3)
PROT UR-MCNC: NEGATIVE MG/DL — SIGNIFICANT CHANGE UP
PROTHROM AB SERPL-ACNC: 15.8 SEC — HIGH (ref 9.8–12.7)
RBC # BLD: 3.41 M/UL — LOW (ref 4.2–5.8)
RBC # FLD: 18.8 % — HIGH (ref 10.3–14.5)
SODIUM SERPL-SCNC: 144 MMOL/L — SIGNIFICANT CHANGE UP (ref 135–145)
SP GR SPEC: 1.01 — SIGNIFICANT CHANGE UP (ref 1.01–1.02)
TIBC SERPL-MCNC: 270 UG/DL — SIGNIFICANT CHANGE UP (ref 220–430)
TRANSFERRIN SERPL-MCNC: 210 MG/DL — SIGNIFICANT CHANGE UP (ref 200–360)
TYPE + AB SCN PNL BLD: SIGNIFICANT CHANGE UP
UIBC SERPL-MCNC: 256 UG/DL — SIGNIFICANT CHANGE UP (ref 110–370)
UROBILINOGEN FLD QL: NEGATIVE MG/DL — SIGNIFICANT CHANGE UP
WBC # BLD: 16.68 K/UL — HIGH (ref 3.8–10.5)
WBC # FLD AUTO: 16.68 K/UL — HIGH (ref 3.8–10.5)

## 2018-08-01 PROCEDURE — 73120 X-RAY EXAM OF HAND: CPT | Mod: 26,LT

## 2018-08-01 PROCEDURE — 99233 SBSQ HOSP IP/OBS HIGH 50: CPT

## 2018-08-01 PROCEDURE — 99223 1ST HOSP IP/OBS HIGH 75: CPT

## 2018-08-01 PROCEDURE — 71045 X-RAY EXAM CHEST 1 VIEW: CPT | Mod: 26

## 2018-08-01 RX ORDER — DORZOLAMIDE HYDROCHLORIDE 20 MG/ML
1 SOLUTION/ DROPS OPHTHALMIC
Qty: 0 | Refills: 0 | Status: DISCONTINUED | OUTPATIENT
Start: 2018-08-01 | End: 2018-08-03

## 2018-08-01 RX ORDER — WARFARIN SODIUM 2.5 MG/1
6 TABLET ORAL ONCE
Qty: 0 | Refills: 0 | Status: COMPLETED | OUTPATIENT
Start: 2018-08-01 | End: 2018-08-01

## 2018-08-01 RX ORDER — SODIUM FERRIC GLUCONAT/SUCROSE 62.5MG/5ML
125 AMPUL (ML) INTRAVENOUS ONCE
Qty: 0 | Refills: 0 | Status: COMPLETED | OUTPATIENT
Start: 2018-08-01 | End: 2018-08-01

## 2018-08-01 RX ORDER — LATANOPROST 0.05 MG/ML
1 SOLUTION/ DROPS OPHTHALMIC; TOPICAL AT BEDTIME
Qty: 0 | Refills: 0 | Status: DISCONTINUED | OUTPATIENT
Start: 2018-08-01 | End: 2018-08-03

## 2018-08-01 RX ORDER — FUROSEMIDE 40 MG
40 TABLET ORAL
Qty: 0 | Refills: 0 | Status: DISCONTINUED | OUTPATIENT
Start: 2018-08-01 | End: 2018-08-03

## 2018-08-01 RX ADMIN — Medication 200 MICROGRAM(S): at 05:39

## 2018-08-01 RX ADMIN — WARFARIN SODIUM 6 MILLIGRAM(S): 2.5 TABLET ORAL at 21:24

## 2018-08-01 RX ADMIN — Medication 1 TABLET(S): at 10:43

## 2018-08-01 RX ADMIN — Medication 650 MILLIGRAM(S): at 06:03

## 2018-08-01 RX ADMIN — HYDROMORPHONE HYDROCHLORIDE 0.5 MILLIGRAM(S): 2 INJECTION INTRAMUSCULAR; INTRAVENOUS; SUBCUTANEOUS at 11:18

## 2018-08-01 RX ADMIN — LIDOCAINE 1 PATCH: 4 CREAM TOPICAL at 10:43

## 2018-08-01 RX ADMIN — ALBUTEROL 2.5 MILLIGRAM(S): 90 AEROSOL, METERED ORAL at 15:08

## 2018-08-01 RX ADMIN — AMLODIPINE BESYLATE 10 MILLIGRAM(S): 2.5 TABLET ORAL at 05:38

## 2018-08-01 RX ADMIN — ALBUTEROL 2.5 MILLIGRAM(S): 90 AEROSOL, METERED ORAL at 19:32

## 2018-08-01 RX ADMIN — OXYCODONE HYDROCHLORIDE 5 MILLIGRAM(S): 5 TABLET ORAL at 20:29

## 2018-08-01 RX ADMIN — HYDROMORPHONE HYDROCHLORIDE 0.5 MILLIGRAM(S): 2 INJECTION INTRAMUSCULAR; INTRAVENOUS; SUBCUTANEOUS at 21:52

## 2018-08-01 RX ADMIN — Medication 10 MILLIGRAM(S): at 05:39

## 2018-08-01 RX ADMIN — DORZOLAMIDE HYDROCHLORIDE 1 DROP(S): 20 SOLUTION/ DROPS OPHTHALMIC at 21:56

## 2018-08-01 RX ADMIN — LIDOCAINE 1 PATCH: 4 CREAM TOPICAL at 22:06

## 2018-08-01 RX ADMIN — Medication 10 MILLIGRAM(S): at 17:14

## 2018-08-01 RX ADMIN — HYDROMORPHONE HYDROCHLORIDE 0.5 MILLIGRAM(S): 2 INJECTION INTRAMUSCULAR; INTRAVENOUS; SUBCUTANEOUS at 22:22

## 2018-08-01 RX ADMIN — HEPARIN SODIUM 5000 UNIT(S): 5000 INJECTION INTRAVENOUS; SUBCUTANEOUS at 17:14

## 2018-08-01 RX ADMIN — OXYCODONE HYDROCHLORIDE 5 MILLIGRAM(S): 5 TABLET ORAL at 06:01

## 2018-08-01 RX ADMIN — ALBUTEROL 2.5 MILLIGRAM(S): 90 AEROSOL, METERED ORAL at 22:54

## 2018-08-01 RX ADMIN — PANTOPRAZOLE SODIUM 40 MILLIGRAM(S): 20 TABLET, DELAYED RELEASE ORAL at 05:39

## 2018-08-01 RX ADMIN — HEPARIN SODIUM 5000 UNIT(S): 5000 INJECTION INTRAVENOUS; SUBCUTANEOUS at 05:38

## 2018-08-01 RX ADMIN — Medication 40 MILLIGRAM(S): at 17:49

## 2018-08-01 RX ADMIN — HYDROMORPHONE HYDROCHLORIDE 0.5 MILLIGRAM(S): 2 INJECTION INTRAMUSCULAR; INTRAVENOUS; SUBCUTANEOUS at 10:43

## 2018-08-01 RX ADMIN — Medication 40 MILLIGRAM(S): at 05:39

## 2018-08-01 RX ADMIN — Medication 110 MILLIGRAM(S): at 16:02

## 2018-08-01 RX ADMIN — LATANOPROST 1 DROP(S): 0.05 SOLUTION/ DROPS OPHTHALMIC; TOPICAL at 21:57

## 2018-08-01 RX ADMIN — OXYCODONE HYDROCHLORIDE 5 MILLIGRAM(S): 5 TABLET ORAL at 19:59

## 2018-08-01 RX ADMIN — HYDROMORPHONE HYDROCHLORIDE 0.5 MILLIGRAM(S): 2 INJECTION INTRAMUSCULAR; INTRAVENOUS; SUBCUTANEOUS at 17:15

## 2018-08-01 NOTE — PHYSICAL THERAPY INITIAL EVALUATION ADULT - PERTINENT HX OF CURRENT PROBLEM, REHAB EVAL
sustained mechanical fall 2 days PTA.  Presented to hospital with severe back pain. CTH/CT CS (-) ac. findings. XRays BUE (-) fx except suspected fx base of L 1st MC fx.  H/H 7.4/26.9. CT scan obtained showed L4 vertebral body fx as well as R sided L5 transverse process fx and R sided 4th rib fx. No acute ortho spine surgical intervention, mobilize as felisa per ortho.

## 2018-08-01 NOTE — PROGRESS NOTE ADULT - SUBJECTIVE AND OBJECTIVE BOX
PCP- DR Aditya Small    CC- s/p mechanical fall 2 days ago    HPI:  89yo/M with PMH afib on coumadin, CKD with unknown baseline creatinine, chr diastolic CHF, COPD on chronic prednisone, BPH presented as trauma after he sustained fall 2 days ago and was unable to get up and ambulate after that. Patient states that  night he tripped over the blanket and fell, he could not reach alert system to call for help and was on the floor until the morning. Yesterday, he had a lot of back pain and could not move, so today he came in to the hospital. C/o RT shoulder pain and back pain. Admitted to trauma, medical consult called for medical co-management    PMH- as above  PSH- denies  Soc hx- denies smoking, lives at home, walks with walker  Fam hx- both parents are     18- pt c/o RT shoulder and back pain  18 no acute events, tele- afib rate controlled    Review of system- All 10 systems reviewed and is as per HPI otherwise negative.     Vital Signs Last 24 Hrs  T(C): 36.6 (01 Aug 2018 10:41), Max: 36.9 (01 Aug 2018 04:55)  T(F): 97.8 (01 Aug 2018 10:41), Max: 98.4 (01 Aug 2018 04:55)  HR: 72 (01 Aug 2018 10:41) (64 - 98)  BP: 139/41 (01 Aug 2018 10:41) (129/59 - 146/52)  BP(mean): --  RR: 20 (01 Aug 2018 10:41) (17 - 20)  SpO2: 100% (01 Aug 2018 10:41) (99% - 100%)    LABS:                        7.4    16.68 )-----------( 476      ( 01 Aug 2018 06:32 )             26.9     01 Aug 2018 06:32    144    |  108    |  50     ----------------------------<  91     4.5     |  28     |  1.88     Ca    8.3        01 Aug 2018 06:32    TPro  5.9    /  Alb  2.6    /  TBili  0.7    /  DBili  x      /  AST  24     /  ALT  29     /  AlkPhos  93     2018 11:50  LIVER FUNCTIONS - ( 2018 11:50 )  Alb: 2.6 g/dL / Pro: 5.9 gm/dL / ALK PHOS: 93 U/L / ALT: 29 U/L / AST: 24 U/L / GGT: x         PT/INR - ( 01 Aug 2018 06:32 )   PT: 15.8 sec;   INR: 1.45 ratio    PTT - ( 2018 11:50 )  PTT:29.8 sec    CARDIAC MARKERS ( 2018 11:50 )  0.026 ng/mL / x     / 67 U/L / x     / x        RADIOLOGY & ADDITIONAL TESTS:  EXAM:  CT ABDOMEN AND PELVIS IC                        EXAM:  CT CHEST IC                        *** ADDENDUM 2018  ***  There is a left inguinal hernia containing nonobstructed sigmoid colon.   Colonic diverticulosis is noted, without diverticulitis.  *** END OF ADDENDUM 2018  **  PROCEDURE DATE:  2018    INTERPRETATION:  Clinical information: Status post fall 2 days ago with   back pain. On Coumadin.    COMPARISON: None    PROCEDURE:   CT of the Chest,Abdomen and Pelvis was performed with intravenous   contrast.   Imaging was performed through the chest in the arterial phase followed by   imaging of the abdomen and pelvis in the portal venous phase.  Intravenous contrast: 90 ml Omnipaque 350. 10 ml discarded.  Oral contrast:None.  Sagittal and coronal reformats were performed.    FINDINGS:    CHEST:     LOWER NECK: Within normal limits.  AXILLA, MEDIASTINUM AND DELMIS: No lymphadenopathy.  VESSELS: Atherosclerotic arterial calcifications, including the coronary   arteries.  HEART: The heart is enlarged.No pericardial effusion.  PLEURA: Small right pleural effusion, partially loculated with mild   associated pleural thickening, likely indicating chronic effusion. No   pneumothorax.  LUNGS ANDLARGE AIRWAYS: Patent central airways. No nodule, mass or   evidence of contusion. Mild apical septal thickening and groundglass   opacity compatible with mild edema.  CHEST WALL:  Unremarkable    ABDOMEN AND PELVIS:    LIVER: Left hepatic lobe cyst.Scattered subcentimeter hypodense lesions   which are too small to characterize.  SPLEEN: Within normal limits.  PANCREAS: Within normal limits.  GALLBLADDER: Cholelithiasis. 1.4 cm segment of focal wall thickening near   the fundus.  BILE DUCTS: Normal caliber.  ADRENALS: Within normal limits.  KIDNEYS/URETERS: Left renal cyst and 1.4 cm upper pole lesion which is   not well characterized due to beam hardening artifact from the patient's   arms being at his side. No hydronephrosis or stone.    RETROPERITONEUM: No upper abdominal, retroperitoneal or pelvic   lymphadenopathy.    VESSELS:  Atherosclerotic arterial calcifications. Normal caliber aorta.    BOWEL: No bowel obstruction, wall thickening or inflammatory change.   Appendix not identified.  PERITONEUM: No ascites, hemorrhage or pneumoperitoneum.    REPRODUCTIVE ORGANS: Markedly enlarged prostate.  BLADDER: Within normal limits    ABDOMINAL WALL: Within normal limits.  BONES: Acute fracture of the L4 vertebral body with mild depression of   the superior endplate and no retropulsion. Acute nondisplaced fracture of   the right L5 transverse process at its junction with the vertebral body.   Lucency and mild cortical step off of the anterolateral right fourth rib   suspicious for fracture.    IMPRESSION:     Acute fractures of the L4 vertebral body and right L5 transverse process.   Suspicion for nondisplaced right fourth rib fracture.  These findings   were discussed with LAURIE Zheng on .18, 1430 hours.  Small loculated right pleural effusion. Partial right lower lobe   atelectasis.  Mild pulmonary edema.  1.4 cm area of focal gallbladder wall thickening could be due to   adenomyomatosis versus neoplasm.    EXAM:  CT CERVICAL SPINE                        PROCEDURE DATE:  2018    INTERPRETATION:      CT cervical spine without IV contrast        CLINICAL INFORMATION: Fracture, trauma, neck pain.  Neck pain, spinal   stenosis, spondylosis. s/p fall 2 days ago on coumadin s/p fall 2 days ago   on coumadin    TECHNIQUE:  Contiguous axial 2.0 mm sections were obtained through the   cervical spine using a single helical acquisition.  Additional 2 mm   sagittal and coronal reformatted reconstructions of the spine were   obtained.  These additional reformatted images were used to evaluate the   spine for alignment, vertebral fractures and the integrity of the the   posterior elements.   This scan was performed using automatic exposure   control (radiation dose reduction software) to obtain a diagnostic image   quality scan with patient dose as low as reasonably achievable.        FINDINGS:   No prior similar studies are available for review    Cervical vertebral body heights are maintained. No vertebral fracture is   seen. No destructive bone lesion is found.  Alignment is preserved. There   is an osseous fusion extending from C5 through C7.  Facet joints appear   intact and aligned.    Cervical intervertebral disc spaces show multilevel degenerative disc   disease and spondylosis at C2-3 through C7-T1 with loss of disc height   and associated degenerative endplate changes. There is narrowing of the   LEFT C2-3, LEFT C3-4, BILATERAL C4-5, C5-6 and RIGHT C6-7 neural foramina   due to uncovertebral spurring and facet osteophytic hypertrophy.   Degenerative cord impingement is seen at C4-5.   MR would be required to   evaluate the intervertebral discs at higher sensitivity for disc   pathology.    The skull base appears intact.  No neck mass is recognized.  Paraspinal   soft tissues appear intact. Visualized lymph nodes appear to be within   physiologic size limits.           IMPRESSION:   No vertebral fracture is recognized.  Multilevel   degenerative disc disease and spondylosis at C2-3 through C7-T1   with narrowing of the LEFT C2-3, LEFT C3-4, BILATERAL C4-5, C5-6 and RIGHT   C6-7 neural foramina due to uncovertebral spurring and facet osteophytic   hypertrophy. Degenerative cord impingement is seen at C4-5.      PHYSICAL EXAM:  GENERAL: NAD, well-groomed, well-developed  HEAD:  Normocephalic, multiple bruises  EYES: EOMI, PERRLA, conjunctiva and sclera clear  HEENT: Moist mucous membranes  NECK: Supple, No JVD  NERVOUS SYSTEM:  Alert & Oriented X3, Motor Strength 5/5 B/L upper and lower extremities; DTRs 2+ intact and symmetric  CHEST/LUNG: Clear to auscultation bilaterally; No rales, rhonchi, wheezing, or rubs  HEART: Irregular rate and rhythm; No murmurs, rubs, or gallops  ABDOMEN: Soft, Nontender, Nondistended; Bowel sounds present  GENITOURINARY- Voiding, no palpable bladder  EXTREMITIES:  2+ Peripheral Pulses, No clubbing, cyanosis, or edema  MUSCULOSKELTAL- RT shoulder swelling and superficial bruises.   SKIN-multiple hematomas mostly on the right side of the body  CNS- alert, oriented X3, non focal     MEDICATIONS  (STANDING):  ALBUTerol    0.083% 2.5 milliGRAM(s) Nebulizer every 6 hours  amLODIPine   Tablet 10 milliGRAM(s) Oral daily  dorzolamide 2% Ophthalmic Solution 1 Drop(s) Both EYES <User Schedule>  furosemide    Tablet 40 milliGRAM(s) Oral daily  heparin  Injectable 5000 Unit(s) SubCutaneous every 12 hours  latanoprost 0.005% Ophthalmic Solution 1 Drop(s) Both EYES at bedtime  levothyroxine 200 MICROGram(s) Oral daily  lidocaine   Patch 1 Patch Transdermal daily  multivitamin 1 Tablet(s) Oral daily  pantoprazole    Tablet 40 milliGRAM(s) Oral before breakfast  predniSONE   Tablet 10 milliGRAM(s) Oral two times a day    MEDICATIONS  (PRN):  acetaminophen   Tablet 650 milliGRAM(s) Oral every 6 hours PRN For Temp greater than 38 C (100.4 F)  ALBUTerol    0.083% 2.5 milliGRAM(s) Nebulizer every 3 hours PRN Shortness of Breath and/or Wheezing  HYDROmorphone  Injectable 0.5 milliGRAM(s) IV Push every 4 hours PRN Severe Pain (7 - 10)  ondansetron Injectable 4 milliGRAM(s) IV Push every 6 hours PRN Nausea  oxyCODONE    IR 5 milliGRAM(s) Oral every 6 hours PRN Moderate Pain (4 - 6)    Assessment/Plan  #S/p mechanical fall with L4 compression fracture/L5 transverse process fracture/?rib fracture  Trauma eval appreciated  Pain meds prn  Spine eval appreciated- for TLSO brace  Lidoderm patch  PT eval after seen by spine  incentive spirometry  Bowel regimen    #Anemia likely acute blood loss from trauma and multiple soft tissue hematomas on chronic disease  Patient denies active GI bleed  Iron-deficient  WIll transfuse 2 Units PRBC now  May need non-urgent outpatient GI work up for chronic iron-deficiency anemia when out of rehab  #Thrombocytosis- likely reactive  #Leukocytosis improving. Non-toxic. Observe off antibiotics    #COPD on chr prednisone  Cont maint dose of Prednisone  Inhalers prn    #Chr afib on coumadin  Cont coumadin, dose to keep INR therapeutic    #Likely baseline CKD  Previous CR back in 2016- 2.0  Monitor BMP    #Acute on Chr diastolic CHF  Imaging studies show mild pulm congestion. Clinically patient looks compensated  S/p IV Lasix  Would maintain PO Lasix 40mg BID  Cardio eval appreciated    #BPH- monitor for voiding difficulties    #Dispo- transfuse, TLSO brace and PT eval. May transfer to [address] [place] [address]. Eventually [facility] on Friday PCP- DR Aditya Small    CC- s/p mechanical fall 2 days ago    HPI:  89yo/M with PMH afib on coumadin, CKD with unknown baseline creatinine, chr diastolic CHF, COPD on chronic prednisone, BPH presented as trauma after he sustained fall 2 days ago and was unable to get up and ambulate after that. Patient states that  night he tripped over the blanket and fell, he could not reach alert system to call for help and was on the floor until the morning. Yesterday, he had a lot of back pain and could not move, so today he came in to the hospital. C/o RT shoulder pain and back pain. Admitted to trauma, medical consult called for medical co-management    PMH- as above  PSH- denies  Soc hx- denies smoking, lives at home, walks with walker  Fam hx- both parents are     18- pt c/o RT shoulder and back pain  18 no acute events, tele- afib rate controlled    Review of system- All 10 systems reviewed and is as per HPI otherwise negative.     Vital Signs Last 24 Hrs  T(C): 36.6 (01 Aug 2018 10:41), Max: 36.9 (01 Aug 2018 04:55)  T(F): 97.8 (01 Aug 2018 10:41), Max: 98.4 (01 Aug 2018 04:55)  HR: 72 (01 Aug 2018 10:41) (64 - 98)  BP: 139/41 (01 Aug 2018 10:41) (129/59 - 146/52)  BP(mean): --  RR: 20 (01 Aug 2018 10:41) (17 - 20)  SpO2: 100% (01 Aug 2018 10:41) (99% - 100%)    LABS:                        8.4    x     )-----------( x        ( 01 Aug 2018 13:45 )             30.7     01 Aug 2018 06:32    144    |  108    |  50     ----------------------------<  91     4.5     |  28     |  1.88     Ca    8.3        01 Aug 2018 06:32  TPro  5.9    /  Alb  2.6    /  TBili  0.7    /  DBili  x      /  AST  24     /  ALT  29     /  AlkPhos  93     2018 11:5  LIVER FUNCTIONS - ( 2018 11:50 )  Alb: 2.6 g/dL / Pro: 5.9 gm/dL / ALK PHOS: 93 U/L / ALT: 29 U/L / AST: 24 U/L / GGT: x         PT/INR - ( 01 Aug 2018 06:32 )   PT: 15.8 sec;   INR: 1.45 ratio     PTT - ( 2018 11:50 )  PTT:29.8 sec    CARDIAC MARKERS ( 2018 11:50 )  0.026 ng/mL / x     / 67 U/L / x     / x          RADIOLOGY & ADDITIONAL TESTS:  EXAM:  CT ABDOMEN AND PELVIS IC                        EXAM:  CT CHEST IC                        *** ADDENDUM 2018  ***  There is a left inguinal hernia containing nonobstructed sigmoid colon.   Colonic diverticulosis is noted, without diverticulitis.  *** END OF ADDENDUM 2018  **  PROCEDURE DATE:  2018    INTERPRETATION:  Clinical information: Status post fall 2 days ago with   back pain. On Coumadin.    COMPARISON: None    PROCEDURE:   CT of the Chest,Abdomen and Pelvis was performed with intravenous   contrast.   Imaging was performed through the chest in the arterial phase followed by   imaging of the abdomen and pelvis in the portal venous phase.  Intravenous contrast: 90 ml Omnipaque 350. 10 ml discarded.  Oral contrast:None.  Sagittal and coronal reformats were performed.    FINDINGS:    CHEST:     LOWER NECK: Within normal limits.  AXILLA, MEDIASTINUM AND DELMIS: No lymphadenopathy.  VESSELS: Atherosclerotic arterial calcifications, including the coronary   arteries.  HEART: The heart is enlarged.No pericardial effusion.  PLEURA: Small right pleural effusion, partially loculated with mild   associated pleural thickening, likely indicating chronic effusion. No   pneumothorax.  LUNGS ANDLARGE AIRWAYS: Patent central airways. No nodule, mass or   evidence of contusion. Mild apical septal thickening and groundglass   opacity compatible with mild edema.  CHEST WALL:  Unremarkable    ABDOMEN AND PELVIS:    LIVER: Left hepatic lobe cyst.Scattered subcentimeter hypodense lesions   which are too small to characterize.  SPLEEN: Within normal limits.  PANCREAS: Within normal limits.  GALLBLADDER: Cholelithiasis. 1.4 cm segment of focal wall thickening near   the fundus.  BILE DUCTS: Normal caliber.  ADRENALS: Within normal limits.  KIDNEYS/URETERS: Left renal cyst and 1.4 cm upper pole lesion which is   not well characterized due to beam hardening artifact from the patient's   arms being at his side. No hydronephrosis or stone.    RETROPERITONEUM: No upper abdominal, retroperitoneal or pelvic   lymphadenopathy.    VESSELS:  Atherosclerotic arterial calcifications. Normal caliber aorta.    BOWEL: No bowel obstruction, wall thickening or inflammatory change.   Appendix not identified.  PERITONEUM: No ascites, hemorrhage or pneumoperitoneum.    REPRODUCTIVE ORGANS: Markedly enlarged prostate.  BLADDER: Within normal limits    ABDOMINAL WALL: Within normal limits.  BONES: Acute fracture of the L4 vertebral body with mild depression of   the superior endplate and no retropulsion. Acute nondisplaced fracture of   the right L5 transverse process at its junction with the vertebral body.   Lucency and mild cortical step off of the anterolateral right fourth rib   suspicious for fracture.    IMPRESSION:     Acute fractures of the L4 vertebral body and right L5 transverse process.   Suspicion for nondisplaced right fourth rib fracture.  These findings   were discussed with LAURIE Zheng on 18, 1430 hours.  Small loculated right pleural effusion. Partial right lower lobe   atelectasis.  Mild pulmonary edema.  1.4 cm area of focal gallbladder wall thickening could be due to   adenomyomatosis versus neoplasm.    EXAM:  CT CERVICAL SPINE                        PROCEDURE DATE:  2018    INTERPRETATION:      CT cervical spine without IV contrast        CLINICAL INFORMATION: Fracture, trauma, neck pain.  Neck pain, spinal   stenosis, spondylosis. s/p fall 2 days ago on coumadin s/p fall 2 days ago   on coumadin    TECHNIQUE:  Contiguous axial 2.0 mm sections were obtained through the   cervical spine using a single helical acquisition.  Additional 2 mm   sagittal and coronal reformatted reconstructions of the spine were   obtained.  These additional reformatted images were used to evaluate the   spine for alignment, vertebral fractures and the integrity of the the   posterior elements.   This scan was performed using automatic exposure   control (radiation dose reduction software) to obtain a diagnostic image   quality scan with patient dose as low as reasonably achievable.        FINDINGS:   No prior similar studies are available for review    Cervical vertebral body heights are maintained. No vertebral fracture is   seen. No destructive bone lesion is found.  Alignment is preserved. There   is an osseous fusion extending from C5 through C7.  Facet joints appear   intact and aligned.    Cervical intervertebral disc spaces show multilevel degenerative disc   disease and spondylosis at C2-3 through C7-T1 with loss of disc height   and associated degenerative endplate changes. There is narrowing of the   LEFT C2-3, LEFT C3-4, BILATERAL C4-5, C5-6 and RIGHT C6-7 neural foramina   due to uncovertebral spurring and facet osteophytic hypertrophy.   Degenerative cord impingement is seen at C4-5.   MR would be required to   evaluate the intervertebral discs at higher sensitivity for disc   pathology.    The skull base appears intact.  No neck mass is recognized.  Paraspinal   soft tissues appear intact. Visualized lymph nodes appear to be within   physiologic size limits.           IMPRESSION:   No vertebral fracture is recognized.  Multilevel   degenerative disc disease and spondylosis at C2-3 through C7-T1   with narrowing of the LEFT C2-3, LEFT C3-4, BILATERAL C4-5, C5-6 and RIGHT   C6-7 neural foramina due to uncovertebral spurring and facet osteophytic   hypertrophy. Degenerative cord impingement is seen at C4-5.      PHYSICAL EXAM:  GENERAL: NAD, well-groomed, well-developed  HEAD:  Normocephalic, multiple bruises  EYES: EOMI, PERRLA, conjunctiva and sclera clear  HEENT: Moist mucous membranes  NECK: Supple, No JVD  NERVOUS SYSTEM:  Alert & Oriented X3, Motor Strength 5/5 B/L upper and lower extremities; DTRs 2+ intact and symmetric  CHEST/LUNG: Clear to auscultation bilaterally; No rales, rhonchi, wheezing, or rubs  HEART: Irregular rate and rhythm; No murmurs, rubs, or gallops  ABDOMEN: Soft, Nontender, Nondistended; Bowel sounds present  GENITOURINARY- Voiding, no palpable bladder  EXTREMITIES:  2+ Peripheral Pulses, No clubbing, cyanosis, or edema  MUSCULOSKELTAL- RT shoulder swelling and superficial bruises.   SKIN-multiple hematomas mostly on the right side of the body  CNS- alert, oriented X3, non focal     MEDICATIONS  (STANDING):  ALBUTerol    0.083% 2.5 milliGRAM(s) Nebulizer every 6 hours  amLODIPine   Tablet 10 milliGRAM(s) Oral daily  dorzolamide 2% Ophthalmic Solution 1 Drop(s) Both EYES <User Schedule>  furosemide    Tablet 40 milliGRAM(s) Oral daily  heparin  Injectable 5000 Unit(s) SubCutaneous every 12 hours  latanoprost 0.005% Ophthalmic Solution 1 Drop(s) Both EYES at bedtime  levothyroxine 200 MICROGram(s) Oral daily  lidocaine   Patch 1 Patch Transdermal daily  multivitamin 1 Tablet(s) Oral daily  pantoprazole    Tablet 40 milliGRAM(s) Oral before breakfast  predniSONE   Tablet 10 milliGRAM(s) Oral two times a day    MEDICATIONS  (PRN):  acetaminophen   Tablet 650 milliGRAM(s) Oral every 6 hours PRN For Temp greater than 38 C (100.4 F)  ALBUTerol    0.083% 2.5 milliGRAM(s) Nebulizer every 3 hours PRN Shortness of Breath and/or Wheezing  HYDROmorphone  Injectable 0.5 milliGRAM(s) IV Push every 4 hours PRN Severe Pain (7 - 10)  ondansetron Injectable 4 milliGRAM(s) IV Push every 6 hours PRN Nausea  oxyCODONE    IR 5 milliGRAM(s) Oral every 6 hours PRN Moderate Pain (4 - 6)    Assessment/Plan  #S/p mechanical fall with L4 compression fracture/L5 transverse process fracture/?rib fracture  Trauma eval appreciated  Pain meds prn  Spine eval appreciated- for TLSO brace  Lidoderm patch  PT eval after seen by spine  incentive spirometry  Bowel regimen    #Anemia likely acute blood loss from trauma and multiple soft tissue hematomas on chronic disease  Patient denies active GI bleed  Iron-deficient  Hold off PRBC transfusion for now as repeat HH is better  May need non-urgent outpatient GI work up for chronic iron-deficiency anemia when out of rehab  #Thrombocytosis- likely reactive  #Leukocytosis improving. Non-toxic. Observe off antibiotics    #COPD on chr prednisone  Cont maint dose of Prednisone  Inhalers prn    #Chr afib on coumadin  Cont coumadin, dose to keep INR therapeutic    #Likely baseline CKD  Previous CR back in 2016- 2.0  Monitor BMP    #Acute on Chr diastolic CHF  Imaging studies show mild pulm congestion. Clinically patient looks compensated  S/p IV Lasix  Would maintain PO Lasix 40mg BID  Cardio eval appreciated    #BPH- monitor for voiding difficulties    #Dispo-TLSO brace and PT eval. May transfer to [address] [place] [address]. Eventually [facility] on Friday

## 2018-08-01 NOTE — CONSULT NOTE ADULT - ASSESSMENT
89 yo M with multiple med problems s/p mechanical fall now with L4 vertebral body fx, R sided L5 TP fx  - No acute ortho spine surgical intervention  - Recommend standing x-ray in lumbar corset brace to check alignment of lumbar spine.   - Mobilize as tolerated  - care per primary team    Donny Mancuos MD  [facility],

## 2018-08-01 NOTE — CONSULT NOTE ADULT - SUBJECTIVE AND OBJECTIVE BOX
89 yo M with PMHx of Afib on Coumadin, CKD, diastolic CHF, COPD sustained mechanical fall 2 days ago.  Presented to hospital with severe back pain.  CT scan obtained showed L4 vertebral body fx as well as R sided L5 transverse process fx and R sided 4th rib fx.  Able to ambulate independently after fall.     PMH- as above  PSH- denies  Soc hx- denies smoking, lives at home, walks with walker  Fam hx- both parents are       Review of Systems:  Review of Systems: back pain, rib pain	  Other Review of Systems: All other review of systems negative, except as noted in HPI	      Allergies and Intolerances:        Allergies:  	No Known Allergies:     Home Medications:   * Patient Currently Takes Medications as of 02-Oct-2014 21:11 documented in Structured Notes  · 	predniSONE 10 mg oral tablet: 1 tab(s) orally 2 times a day  · 	amLODIPine 10 mg oral tablet: 1 tab(s) orally once a day  · 	furosemide 40 mg oral tablet: 1 tab(s) orally once a day  · 	Synthroid 200 mcg (0.2 mg) oral tablet: 1 tab(s) orally once a day  · 	NexIUM 40 mg oral delayed release capsule: 1 cap(s) orally once a day      Exam -  +TTP over lumbar spine.   5/5 throughout b/l LE  Sensation grossly intact light touch    CT Scan reviewed: L4 vertebral body fx with minimally loss of height, no extension of fx into posterior elements.  L5 R sided TP fx, minimally displaced.

## 2018-08-01 NOTE — PROGRESS NOTE ADULT - SUBJECTIVE AND OBJECTIVE BOX
Patient is a 89y old  Male who presents with a chief complaint of back pain s/p fall, 7/29/18 (31 Jul 2018 15:30)    HPI:  Trauma Consult    Pt s.p mechanical fall from standing height at home where he lives alone, on 7/29/18. Pt remained on the floor for several hours prior to being assisted by his son. Pt did not seek medical attention until 7/31/18. Pt found to have lumbar fractures, right rib fracture, correlating to region of pain complaints    Pt seen and examined at bedside with chaperone. Pt is AAOx3, pt in no acute distress. Pt denied c/o fever, chills, chest pain, SOB, abd pain, N/V/D, extremity pain or dysfunction, hemoptysis, hematemesis, hematuria, hematochexia, headache, diplopia, vertigo, dizzyness. Pt stated tolerating diet, (+) void, (+) ambulation, (+) bowel function (31 Jul 2018 15:30)  8/1: Pt seen and examined, pain controlled, No headache, no neck pain. Neur ochecks stable, no sensory motor compliant. No SOB, O2 sat stable on supplemental O2. No abdominal pain. No bony pelvis pain. Moves all extremities, no focal neurological complaint. No fever. HD stable, H/H dropped but Repeat is stable.  ROS:.  [X] A ten-point review of systems was otherwise negative except as noted.  Systemic:	[ ] Fever	[ ] Chills	[ ] Night sweats    [ ] Fatigue	[ ] Other  [] Cardiovascular:  [] Pulmonary:  [] Renal/Urologic:  [] Gastrointestinal: abdominal pain, vomiting  [] Metabolic:  [] Neurologic:  [] Hematologic:  [] ENT:  [] Ophthalmologic:  [] Musculoskeletal:    [ ] Due to altered mental status/intubation, subjective information were not able to be obtained from the patient. History was obtained, to the extent possible, from review of the chart and collateral sources of information.    PAST MEDICAL & SURGICAL HISTORY:  Chronic back pain  COPD (chronic obstructive pulmonary disease)  Kidney disease: since 1 year  Hypothyroidism  Occult GI bleeding  Atrial fibrillation  Diastolic heart failure  Hypertension  History of cataract extraction, unspecified laterality    FAMILY HISTORY:  Family history unknown    [place]  Social history:     Alcohol: Denied  Smoking: Denied  Drug Use: Denied        Vital Signs Last 24 Hrs  T(C): 36.6 (01 Aug 2018 10:41), Max: 36.9 (01 Aug 2018 04:55)  T(F): 97.8 (01 Aug 2018 10:41), Max: 98.4 (01 Aug 2018 04:55)  HR: 72 (01 Aug 2018 15:20) (64 - 98)  BP: 139/41 (01 Aug 2018 10:41) (129/59 - 146/52)  BP(mean): --  RR: 20 (01 Aug 2018 10:41) (18 - 20)  SpO2: 100% (01 Aug 2018 10:41) (100% - 100%)  PHYSICAL EXAM:  Constitutional: NAD, GCS: 15/15  AOX2  Eyes:  WNL  ENMT:  WNL  Neck:  WNL, non tender  Back: Non tender  Respiratory: CTABL, Right chest wall tenderness left shoulder, and arm abrasions.  Cardiovascular:  S1+S2+0  Gastrointestinal: Soft, ND , NT left inguinal hernia, soft, NT.  Genitourinary:  WNL  Extremities: NV intact  Vascular:  Intact  Neurological: No focal neurological deficit,  CN, motor and sensory system grossly intact.  Skin: WNL  Musculoskeletal: WNL  Psychiatric: Grossly WNL      Labs:                          8.4    x     )-----------( x        ( 01 Aug 2018 13:45 )             30.7       08-01    144  |  108  |  50<H>  ----------------------------<  91  4.5   |  28  |  1.88<H>    Ca    8.3<L>      01 Aug 2018 06:32    TPro  5.9<L>  /  Alb  2.6<L>  /  TBili  0.7  /  DBili  x   /  AST  24  /  ALT  29  /  AlkPhos  93  07-31      PT/INR - ( 01 Aug 2018 06:32 )   PT: 15.8 sec;   INR: 1.45 ratio         PTT - ( 31 Jul 2018 11:50 )  PTT:29.8 sec    Radiology:    < from: Xray Chest 1 View AP/PA. (08.01.18 @ 10:15) >  EXAM:  XR CHEST AP OR PA 1V                            PROCEDURE DATE:  08/01/2018          INTERPRETATION:  Exam Date: 8/1/2018 10:15 AM    Chest radiograph (one view)         CLINICAL INFORMATION:  rib fracture    TECHNIQUE:  Single frontal view of the chest was obtained.    COMPARISON: October 7, 2014    FINDINGS/  IMPRESSION:          Small to moderate right pleural effusion and small left pleural effusion   is present. Pulmonary vascular congestion is suggested.      < from: CT Chest w/ IV Cont (07.31.18 @ 13:27) >  There is a left inguinal hernia containing nonobstructed sigmoid colon.   Colonic diverticulosis is noted, without diverticulitis.      *** END OF ADDENDUM 07/31/2018  ***      PROCEDURE DATE:  07/31/2018          INTERPRETATION:  Clinical information: Status post fall 2 days ago with   back pain. On Coumadin.    COMPARISON: None    PROCEDURE:   CT of the Chest,Abdomen and Pelvis was performed with intravenous   contrast.   Imaging was performed through the chest in the arterial phase followed by   imaging of the abdomen and pelvis in the portal venous phase.  Intravenous contrast: 90 ml Omnipaque 350. 10 ml discarded.  Oral contrast:None.  Sagittal and coronal reformats were performed.    FINDINGS:    CHEST:     LOWER NECK: Within normal limits.  AXILLA, MEDIASTINUM AND DELMIS: No lymphadenopathy.  VESSELS: Atherosclerotic arterial calcifications, including the coronary   arteries.  HEART: The heart is enlarged.No pericardial effusion.  PLEURA: Small right pleural effusion, partially loculated with mild   associated pleural thickening, likely indicating chronic effusion. No   pneumothorax.  LUNGS ANDLARGE AIRWAYS: Patent central airways. No nodule, mass or   evidence of contusion. Mild apical septal thickening and groundglass   opacity compatible with mild edema.  CHEST WALL:  Unremarkable    ABDOMEN AND PELVIS:    < from: CT Cervical Spine No Cont (07.31.18 @ 13:26) >    < from: Xray Forearm, Left (07.31.18 @ 13:06) >    EXAM:  ELBOW 2VIEWS LT                          EXAM:  FOREARM-ULNA & RADIUS-LEFT                            PROCEDURE DATE:  07/31/2018          INTERPRETATION:  FOREARM-ULNA RADIUS-LEFT, ELBOW 2VIEWS LT    HISTORY:  Fall with left elbow and arm pain    VIEWS:  AP, Lateral, Oblique, Radial head views of the left elbow; AP,   lateral views of the left forearm    Suspected fracture at the base of the first metacarpal. Distal humerus,   radius and ulna reveal intact cortical margins. Trabecular architecture   is normal. There are no fractures.    Joint spaces are well-maintained. There is no evidence of a joint   effusion.    There is no abnormal soft tissue swelling.    IMPRESSION:      Left elbow, left radius, left ulna are intact. Suspected fracture at the   base of the left first metacarpal. Dedicated left hand/wrist radiographs   are recommended for confirmation.        < end of copied text >

## 2018-08-01 NOTE — CONSULT NOTE ADULT - SUBJECTIVE AND OBJECTIVE BOX
HPI:    Pt s.p mechanical fall from standing height at home where he lives alone, on 7/29/18. Pt remained on the floor for several hours prior to being assisted by his son. Pt did not seek medical attention until 7/31/18. Pt found to have lumbar fractures, right rib fracture, correlating to region of pain complaints    Pt seen and examined at bedside by Dr Garza with chaperone. Pt is AAOx3, pt in no acute distress. Pt denied c/o fever, chills, chest pain, SOB, abd pain, N/V/D, extremity pain or dysfunction, hemoptysis, hematemesis, hematuria, hematochexia, headache, diplopia, vertigo, dizzyness. Pt stated tolerating diet, (+) void, (+) ambulation, (+) bowel function (31 Jul 2018 15:30)      Patient is a 89y old  Male who presents with a chief complaint of back pain s/p fall, 7/29/18 (31 Jul 2018 15:30)  pt found t have  Acute fractures of the L4 vertebral body and right L5 transverse process.   Suspicion for nondisplaced right fourth rib fracture.  Small loculated right pleural effusion. Partial right lower lobe   atelectasis. Mild pulmonary edema.  1.4 cm area of focal gallbladder wall thickening could be due to   adenomyomatosis versus neoplasm on 7-31-18    Consulted by Dr. Mariely Garza for VTE prophylaxis, risk stratification, and anticoagulation management.    PAST MEDICAL & SURGICAL HISTORY:  Chronic back pain  COPD (chronic obstructive pulmonary disease)  Kidney disease: since 1 year  Hypothyroidism  Occult GI bleeding  Atrial fibrillation  Diastolic heart failure  Hypertension  History of cataract extraction, unspecified laterality    IMPROVE VTE Risk Score: 1    [id number] Score: 4    IMPROVE Bleeding Risk Score: 8  high    Falls Risk:   High ( x )  Mod (  )  Low (  )  8-1-18 Pt seen at bedside on [facility].  Pt in bed states his back pain has decreased because he had pain meds.  Discussed his anticoagulation with coumadin state has been on coumadin for about 30 years and he take 5mg then 2.5mg for two days. managed by Dr. Quintana in [place]. No concerns.  Made him aware of his INR  and his decreased h/h.  Will hold today and give him heparin  sq.  Pt v/u of the need.     Vital Signs Last 24 Hrs  T(C): 36.9 (01 Aug 2018 04:55), Max: 36.9 (01 Aug 2018 04:55)  T(F): 98.4 (01 Aug 2018 04:55), Max: 98.4 (01 Aug 2018 04:55)  HR: 64 (01 Aug 2018 04:55) (64 - 98)  BP: 129/59 (01 Aug 2018 04:55) (129/59 - 146/52)  BP(mean): --  RR: 20 (31 Jul 2018 21:05) (16 - 20)  SpO2: 100% (01 Aug 2018 04:55) (99% - 100%)  FAMILY HISTORY:  Family history unknown    Denies any personal or familial history of clotting or bleeding disorders.    Allergies    No Known Allergies    Intolerances        REVIEW OF SYSTEMS    (  )Fever	     (  )Constipation	(  )SOB				(  )Headache	(  )Dysuria  (  )Chills	     (  )Melena	(  )Dyspnea present on exertion	                    (  )Dizziness                    (  )Polyuria  (  )Nausea	     (  )Hematochezia	(  )Cough			                    (  )Syncope   	(  )Hematuria  (  )Vomiting    (  )Chest Pain	(  )Wheezing			(  )Weakness  (  )Diarrhea     (  )Palpitations	(  )Anorexia			(  )Myalgia    All other review of systems negative: Yes      PHYSICAL EXAM:    Constitutional: Appears Well    Neurological: A& O x 3    Skin: Warm    Respiratory and Thorax: normal effort; Breath sounds: normal; No rales/wheezing/rhonchi  	  Cardiovascular: S1, S2, regular, NMBR	    Gastrointestinal: BS + x 4Q, nontender	    Genitourinary:  Bladder nondistended, nontender    Musculoskeletal:   General Right:   no muscle/joint tenderness,   normal tone, no joint swelling,   ROM: limited	    General Left:   no muscle/joint tenderness,   normal tone, no joint swelling,   ROM: limitedl      Lower extrems:   Right: no calf tenderness              negative suma's sign               + pedal pulses    Left:   no calf tenderness              negative suma's sign               + pedal pulses                          7.4    16.68 )-----------( 476      ( 01 Aug 2018 06:32 )             26.9       08-01    144  |  108  |  50<H>  ----------------------------<  91  4.5   |  28  |  1.88<H>    Ca    8.3<L>      01 Aug 2018 06:32    TPro  5.9<L>  /  Alb  2.6<L>  /  TBili  0.7  /  DBili  x   /  AST  24  /  ALT  29  /  AlkPhos  93  07-31      PT/INR - ( 01 Aug 2018 06:32 )   PT: 15.8 sec;   INR: 1.45 ratio         PTT - ( 31 Jul 2018 11:50 )  PTT:29.8 sec				    IMPRESSION: CT CHEST WITH IV CONTRAST 7-31-18    Acute fractures of the L4 vertebral body and right L5 transverse process.   Suspicion for nondisplaced right fourth rib fracture.  These findings   were discussed with LAURIE Zheng on 7.31.18, 1430 hours.    Small loculated right pleural effusion. Partial right lower lobe   atelectasis.    Mild pulmonary edema.    1.4 cm area of focal gallbladder wall thickening could be due to   adenomyomatosis versus neoplasm    MEDICATIONS  (STANDING):  ALBUTerol    0.083% 2.5 milliGRAM(s) Nebulizer every 6 hours  amLODIPine   Tablet 10 milliGRAM(s) Oral daily  furosemide    Tablet 40 milliGRAM(s) Oral daily  heparin  Injectable 5000 Unit(s) SubCutaneous every 12 hours  levothyroxine 200 MICROGram(s) Oral daily  lidocaine   Patch 1 Patch Transdermal daily  multivitamin 1 Tablet(s) Oral daily  pantoprazole    Tablet 40 milliGRAM(s) Oral before breakfast  predniSONE   Tablet 10 milliGRAM(s) Oral two times a day          DVT Prophylaxis:  LMWH                   (  )  Heparin SQ           (X  )  Coumadin             (  )  Xarelto                  (  )  Eliquis                   (  )  Venodynes           (X  )  Ambulation          ( X )  UFH                       (  )  Contraindicated  (  ) HPI:    Pt s.p mechanical fall from standing height at home where he lives alone, on 7/29/18. Pt remained on the floor for several hours prior to being assisted by his son. Pt did not seek medical attention until 7/31/18. Pt found to have lumbar fractures, right rib fracture, correlating to region of pain complaints    Pt seen and examined at bedside by Dr Garza with chaperone. Pt is AAOx3, pt in no acute distress. Pt denied c/o fever, chills, chest pain, SOB, abd pain, N/V/D, extremity pain or dysfunction, hemoptysis, hematemesis, hematuria, hematochexia, headache, diplopia, vertigo, dizzyness. Pt stated tolerating diet, (+) void, (+) ambulation, (+) bowel function (31 Jul 2018 15:30)      Patient is a 89y old  Male who presents with a chief complaint of back pain s/p fall, 7/29/18 (31 Jul 2018 15:30)  pt found t have  Acute fractures of the L4 vertebral body and right L5 transverse process.   Suspicion for nondisplaced right fourth rib fracture.  Small loculated right pleural effusion. Partial right lower lobe   atelectasis. Mild pulmonary edema.  1.4 cm area of focal gallbladder wall thickening could be due to   adenomyomatosis versus neoplasm on 7-31-18    Consulted by Dr. Mariely Garza for VTE prophylaxis, risk stratification, and anticoagulation management.    PAST MEDICAL & SURGICAL HISTORY:  Chronic back pain  COPD (chronic obstructive pulmonary disease)  Kidney disease: since 1 year  Hypothyroidism  Occult GI bleeding  Atrial fibrillation  Diastolic heart failure  Hypertension  History of cataract extraction, unspecified laterality    IMPROVE VTE Risk Score: 1    [id number] Score: 4    IMPROVE Bleeding Risk Score: 8  high    Falls Risk:   High ( x )  Mod (  )  Low (  )  crcl:29  cr 1.88  8-1-18 Pt seen at bedside on [facility].  Pt in bed states his back pain has decreased because he had pain meds.  Discussed his anticoagulation with coumadin state has been on coumadin for about 30 years and he take 5mg then 2.5mg for two days. managed by Dr. Quintana in [place]. No concerns.  Made him aware of his INR  and his decreased h/h.  Will hold today and give him heparin  sq.  Pt v/u of the need.     Vital Signs Last 24 Hrs  T(C): 36.9 (01 Aug 2018 04:55), Max: 36.9 (01 Aug 2018 04:55)  T(F): 98.4 (01 Aug 2018 04:55), Max: 98.4 (01 Aug 2018 04:55)  HR: 64 (01 Aug 2018 04:55) (64 - 98)  BP: 129/59 (01 Aug 2018 04:55) (129/59 - 146/52)  BP(mean): --  RR: 20 (31 Jul 2018 21:05) (16 - 20)  SpO2: 100% (01 Aug 2018 04:55) (99% - 100%)  FAMILY HISTORY:  Family history unknown    Denies any personal or familial history of clotting or bleeding disorders.    Allergies    No Known Allergies    Intolerances        REVIEW OF SYSTEMS    (  )Fever	     (  )Constipation	(  )SOB				(  )Headache	(  )Dysuria  (  )Chills	     (  )Melena	(  )Dyspnea present on exertion	                    (  )Dizziness                    (  )Polyuria  (  )Nausea	     (  )Hematochezia	(  )Cough			                    (  )Syncope   	(  )Hematuria  (  )Vomiting    (  )Chest Pain	(  )Wheezing			(  )Weakness  (  )Diarrhea     (  )Palpitations	(  )Anorexia			(  )Myalgia    All other review of systems negative: Yes      PHYSICAL EXAM:    Constitutional: Appears Well    Neurological: A& O x 3, ecchymotic area noted to left forehead.    Skin: Warm    Respiratory and Thorax: normal effort; Breath sounds: normal; No rales/wheezing/rhonchi  	  Cardiovascular: S1, S2, regular, NMBR	    Gastrointestinal: BS + x 4Q, nontender	    Genitourinary:  Bladder nondistended, nontender    Musculoskeletal:   General Right:   no muscle/joint tenderness,   normal tone, no joint swelling, dsg noted to rt elbow pt states he bleeds easily when he hits his body.  ROM: limited	    General Left:   no muscle/joint tenderness,   normal tone, no joint swelling,   ROM: limitedl      Lower extrems:   Right: no calf tenderness              negative suma's sign               + pedal pulses, + le edema 2+    Left:   no calf tenderness              negative usma's sign               + pedal pulses, + le edema noted 2+                          7.4    16.68 )-----------( 476      ( 01 Aug 2018 06:32 )             26.9       08-01    144  |  108  |  50<H>  ----------------------------<  91  4.5   |  28  |  1.88<H>    Ca    8.3<L>      01 Aug 2018 06:32    TPro  5.9<L>  /  Alb  2.6<L>  /  TBili  0.7  /  DBili  x   /  AST  24  /  ALT  29  /  AlkPhos  93  07-31      PT/INR - ( 01 Aug 2018 06:32 )   PT: 15.8 sec;   INR: 1.45 ratio         PTT - ( 31 Jul 2018 11:50 )  PTT:29.8 sec				    IMPRESSION: CT CHEST WITH IV CONTRAST 7-31-18    Acute fractures of the L4 vertebral body and right L5 transverse process.   Suspicion for nondisplaced right fourth rib fracture.  These findings   were discussed with LAURIE Zheng on 7.31.18, 1430 hours.    Small loculated right pleural effusion. Partial right lower lobe   atelectasis.    Mild pulmonary edema.    1.4 cm area of focal gallbladder wall thickening could be due to   adenomyomatosis versus neoplasm    MEDICATIONS  (STANDING):  ALBUTerol    0.083% 2.5 milliGRAM(s) Nebulizer every 6 hours  amLODIPine   Tablet 10 milliGRAM(s) Oral daily  furosemide    Tablet 40 milliGRAM(s) Oral daily  heparin  Injectable 5000 Unit(s) SubCutaneous every 12 hours  levothyroxine 200 MICROGram(s) Oral daily  lidocaine   Patch 1 Patch Transdermal daily  multivitamin 1 Tablet(s) Oral daily  pantoprazole    Tablet 40 milliGRAM(s) Oral before breakfast  predniSONE   Tablet 10 milliGRAM(s) Oral two times a day          DVT Prophylaxis:  LMWH                   (  )  Heparin SQ           (X  )  Coumadin             (  )  Xarelto                  (  )  Eliquis                   (  )  Venodynes           (X  )  Ambulation          ( X )  UFH                       (  )  Contraindicated  (  )

## 2018-08-01 NOTE — PHYSICAL THERAPY INITIAL EVALUATION ADULT - ACTIVE RANGE OF MOTION EXAMINATION, REHAB EVAL
except R elbow somewhat ltd by dsg, unable to elevate R shld-c/o pain/bilateral  lower extremity Active ROM was WFL (within functional limits)/bilateral upper extremity Active ROM was WFL (within functional limits)

## 2018-08-01 NOTE — CONSULT NOTE ADULT - ASSESSMENT
This is a 89 year old male s/p fall on on 7-29-18, pt presented to ed on 7-31-18 found to have rx rib and lumbar fx.  Pt has a hx of A. Fib [id number] Score: 4. Pt has morse high thrombosis risk and high bleed risk.    Plan: Hold coumadin for now until pt's H/H is stable  Cont on heparin 5,000 units sq q12h.  :daily cbc, bmp, pt/inr  :le venodynes  :oob as tolerated  Thanks for consult will f/u

## 2018-08-01 NOTE — PHYSICAL THERAPY INITIAL EVALUATION ADULT - GENERAL OBSERVATIONS, REHAB EVAL
pt rec'd supine in bed on 3E, +HM, O2, dsg B elbow (R dsg saturated, appears serous-nsg in to redress), BUE edema. Before PT can address pt, pt insisting he cannot get OOB.

## 2018-08-01 NOTE — PROGRESS NOTE ADULT - SUBJECTIVE AND OBJECTIVE BOX
Patient seen and examined.  His pain is tolerable with the medication.   He does not have a brace.     PE: NAD  Resting in bed comfortably.   Moving LE well with good power.

## 2018-08-01 NOTE — PHYSICAL THERAPY INITIAL EVALUATION ADULT - PRECAUTIONS/LIMITATIONS, REHAB EVAL
fall precautions/spinal precautions clarified w/ spine PA re: indication for brace, brace to be ordered/fall precautions/spinal precautions

## 2018-08-02 LAB
ANION GAP SERPL CALC-SCNC: 7 MMOL/L — SIGNIFICANT CHANGE UP (ref 5–17)
BUN SERPL-MCNC: 56 MG/DL — HIGH (ref 7–23)
CALCIUM SERPL-MCNC: 8.2 MG/DL — LOW (ref 8.5–10.1)
CHLORIDE SERPL-SCNC: 108 MMOL/L — SIGNIFICANT CHANGE UP (ref 96–108)
CO2 SERPL-SCNC: 27 MMOL/L — SIGNIFICANT CHANGE UP (ref 22–31)
CREAT SERPL-MCNC: 2 MG/DL — HIGH (ref 0.5–1.3)
GLUCOSE SERPL-MCNC: 165 MG/DL — HIGH (ref 70–99)
HCT VFR BLD CALC: 26.5 % — LOW (ref 39–50)
HGB BLD-MCNC: 7.4 G/DL — LOW (ref 13–17)
INR BLD: 2.05 RATIO — HIGH (ref 0.88–1.16)
MCHC RBC-ENTMCNC: 21.8 PG — LOW (ref 27–34)
MCHC RBC-ENTMCNC: 27.9 GM/DL — LOW (ref 32–36)
MCV RBC AUTO: 78.2 FL — LOW (ref 80–100)
NRBC # BLD: 0 /100 WBCS — SIGNIFICANT CHANGE UP (ref 0–0)
PLATELET # BLD AUTO: 309 K/UL — SIGNIFICANT CHANGE UP (ref 150–400)
POTASSIUM SERPL-MCNC: 4.5 MMOL/L — SIGNIFICANT CHANGE UP (ref 3.5–5.3)
POTASSIUM SERPL-SCNC: 4.5 MMOL/L — SIGNIFICANT CHANGE UP (ref 3.5–5.3)
PROTHROM AB SERPL-ACNC: 22.5 SEC — HIGH (ref 9.8–12.7)
RBC # BLD: 3.39 M/UL — LOW (ref 4.2–5.8)
RBC # FLD: 18.6 % — HIGH (ref 10.3–14.5)
SODIUM SERPL-SCNC: 142 MMOL/L — SIGNIFICANT CHANGE UP (ref 135–145)
WBC # BLD: 24.75 K/UL — HIGH (ref 3.8–10.5)
WBC # FLD AUTO: 24.75 K/UL — HIGH (ref 3.8–10.5)

## 2018-08-02 PROCEDURE — 99232 SBSQ HOSP IP/OBS MODERATE 35: CPT

## 2018-08-02 PROCEDURE — 99233 SBSQ HOSP IP/OBS HIGH 50: CPT

## 2018-08-02 PROCEDURE — 73120 X-RAY EXAM OF HAND: CPT | Mod: 26,LT

## 2018-08-02 RX ORDER — OXYCODONE HYDROCHLORIDE 5 MG/1
10 TABLET ORAL EVERY 4 HOURS
Qty: 0 | Refills: 0 | Status: DISCONTINUED | OUTPATIENT
Start: 2018-08-02 | End: 2018-08-03

## 2018-08-02 RX ORDER — POLYETHYLENE GLYCOL 3350 17 G/17G
17 POWDER, FOR SOLUTION ORAL
Qty: 0 | Refills: 0 | Status: DISCONTINUED | OUTPATIENT
Start: 2018-08-02 | End: 2018-08-03

## 2018-08-02 RX ORDER — WARFARIN SODIUM 2.5 MG/1
2.5 TABLET ORAL DAILY
Qty: 0 | Refills: 0 | Status: DISCONTINUED | OUTPATIENT
Start: 2018-08-02 | End: 2018-08-03

## 2018-08-02 RX ORDER — OXYCODONE HYDROCHLORIDE 5 MG/1
10 TABLET ORAL EVERY 12 HOURS
Qty: 0 | Refills: 0 | Status: DISCONTINUED | OUTPATIENT
Start: 2018-08-02 | End: 2018-08-02

## 2018-08-02 RX ORDER — DOCUSATE SODIUM 100 MG
100 CAPSULE ORAL THREE TIMES A DAY
Qty: 0 | Refills: 0 | Status: DISCONTINUED | OUTPATIENT
Start: 2018-08-02 | End: 2018-08-03

## 2018-08-02 RX ORDER — OXYCODONE HYDROCHLORIDE 5 MG/1
5 TABLET ORAL EVERY 4 HOURS
Qty: 0 | Refills: 0 | Status: DISCONTINUED | OUTPATIENT
Start: 2018-08-02 | End: 2018-08-03

## 2018-08-02 RX ORDER — OXYCODONE HYDROCHLORIDE 5 MG/1
10 TABLET ORAL EVERY 12 HOURS
Qty: 0 | Refills: 0 | Status: DISCONTINUED | OUTPATIENT
Start: 2018-08-02 | End: 2018-08-03

## 2018-08-02 RX ADMIN — WARFARIN SODIUM 2.5 MILLIGRAM(S): 2.5 TABLET ORAL at 22:41

## 2018-08-02 RX ADMIN — OXYCODONE HYDROCHLORIDE 10 MILLIGRAM(S): 5 TABLET ORAL at 14:47

## 2018-08-02 RX ADMIN — ALBUTEROL 2.5 MILLIGRAM(S): 90 AEROSOL, METERED ORAL at 19:26

## 2018-08-02 RX ADMIN — Medication 200 MICROGRAM(S): at 05:31

## 2018-08-02 RX ADMIN — Medication 100 MILLIGRAM(S): at 14:18

## 2018-08-02 RX ADMIN — Medication 10 MILLIGRAM(S): at 18:20

## 2018-08-02 RX ADMIN — OXYCODONE HYDROCHLORIDE 5 MILLIGRAM(S): 5 TABLET ORAL at 05:56

## 2018-08-02 RX ADMIN — Medication 40 MILLIGRAM(S): at 06:01

## 2018-08-02 RX ADMIN — LIDOCAINE 1 PATCH: 4 CREAM TOPICAL at 22:28

## 2018-08-02 RX ADMIN — PANTOPRAZOLE SODIUM 40 MILLIGRAM(S): 20 TABLET, DELAYED RELEASE ORAL at 05:31

## 2018-08-02 RX ADMIN — Medication 1 TABLET(S): at 11:25

## 2018-08-02 RX ADMIN — LIDOCAINE 1 PATCH: 4 CREAM TOPICAL at 11:23

## 2018-08-02 RX ADMIN — ALBUTEROL 2.5 MILLIGRAM(S): 90 AEROSOL, METERED ORAL at 14:36

## 2018-08-02 RX ADMIN — Medication 40 MILLIGRAM(S): at 18:46

## 2018-08-02 RX ADMIN — HEPARIN SODIUM 5000 UNIT(S): 5000 INJECTION INTRAVENOUS; SUBCUTANEOUS at 05:31

## 2018-08-02 RX ADMIN — OXYCODONE HYDROCHLORIDE 5 MILLIGRAM(S): 5 TABLET ORAL at 20:23

## 2018-08-02 RX ADMIN — DORZOLAMIDE HYDROCHLORIDE 1 DROP(S): 20 SOLUTION/ DROPS OPHTHALMIC at 08:32

## 2018-08-02 RX ADMIN — HYDROMORPHONE HYDROCHLORIDE 0.5 MILLIGRAM(S): 2 INJECTION INTRAMUSCULAR; INTRAVENOUS; SUBCUTANEOUS at 11:33

## 2018-08-02 RX ADMIN — LATANOPROST 1 DROP(S): 0.05 SOLUTION/ DROPS OPHTHALMIC; TOPICAL at 22:42

## 2018-08-02 RX ADMIN — Medication 100 MILLIGRAM(S): at 22:41

## 2018-08-02 RX ADMIN — POLYETHYLENE GLYCOL 3350 17 GRAM(S): 17 POWDER, FOR SOLUTION ORAL at 18:20

## 2018-08-02 RX ADMIN — AMLODIPINE BESYLATE 10 MILLIGRAM(S): 2.5 TABLET ORAL at 06:00

## 2018-08-02 RX ADMIN — ALBUTEROL 2.5 MILLIGRAM(S): 90 AEROSOL, METERED ORAL at 09:19

## 2018-08-02 RX ADMIN — HYDROMORPHONE HYDROCHLORIDE 0.5 MILLIGRAM(S): 2 INJECTION INTRAMUSCULAR; INTRAVENOUS; SUBCUTANEOUS at 11:23

## 2018-08-02 RX ADMIN — Medication 10 MILLIGRAM(S): at 05:31

## 2018-08-02 RX ADMIN — OXYCODONE HYDROCHLORIDE 10 MILLIGRAM(S): 5 TABLET ORAL at 14:18

## 2018-08-02 RX ADMIN — OXYCODONE HYDROCHLORIDE 5 MILLIGRAM(S): 5 TABLET ORAL at 05:26

## 2018-08-02 NOTE — CONSULT NOTE ADULT - ASSESSMENT
89yo/M with PMH afib on coumadin, CKD with unknown baseline creatinine, chr diastolic CHF, COPD on chronic prednisone, BPH presented 7/31 as trauma after he sustained fall 2 days prior to admit and was unable to get up and ambulate after that. Patient states that 7/29 he tripped over the blanket and fell, he could not reach alert system to call for help and was on the floor until the morning. Here, found to have L4 vertebral body fracture, L5 transverse process fx, R 4th rib fx, and L 5th metcarpal fx, Wbc ct noted to be 21 on admit and now 24. Pt on chronic steroids, noted with multiple hematomas/bruising as result recent fall. CT chest shows small loculated R pleural effusion/atelectasis, No resp complaints.     1. leukocytosis/multiple fractures - lumbar spine/R 4th rib/L hand/loculated pleural effusion  - wbc ct could be elevated d/t multifactorial reasons  - has multiple fractures/hematomas and is on steroids  - CT imaging reviewed  - small chronic loculated pleural effusion  - no resp sxs  - f/u cbc for now  - monitor temps  - observe off antibiotics  - supportive care  - surgical f/u    2. other issues - care per medicine

## 2018-08-02 NOTE — PROGRESS NOTE ADULT - SUBJECTIVE AND OBJECTIVE BOX
PCP- DR Aditya Small    CC- s/p mechanical fall 2 days ago    HPI:  89yo/M with PMH afib on coumadin, CKD with unknown baseline creatinine, chr diastolic CHF, COPD on chronic prednisone, BPH presented as trauma after he sustained fall 2 days ago and was unable to get up and ambulate after that. Patient states that  night he tripped over the blanket and fell, he could not reach alert system to call for help and was on the floor until the morning. Yesterday, he had a lot of back pain and could not move, so today he came in to the hospital. C/o RT shoulder pain and back pain. Admitted to trauma, medical consult called for medical co-management    PMH- as above  PSH- denies  Soc hx- denies smoking, lives at home, walks with walker  Fam hx- both parents are     18- pt c/o RT shoulder and back pain  18 no acute events, tele- afib rate controlled  18 states pain is not controlled. Got OOB to chair with PT    Review of system- All 10 systems reviewed and is as per HPI otherwise negative.     Vital Signs Last 24 Hrs  T(C): 36.6 (02 Aug 2018 11:18), Max: 37.4 (01 Aug 2018 23:54)  T(F): 97.9 (02 Aug 2018 11:18), Max: 99.4 (01 Aug 2018 23:54)  HR: 82 (02 Aug 2018 11:18) (68 - 95)  BP: 120/48 (02 Aug 2018 11:18) (115/83 - 128/46)  BP(mean): 61 (02 Aug 2018 11:18) (61 - 61)  RR: 16 (02 Aug 2018 11:18) (16 - 18)  SpO2: 98% (02 Aug 2018 11:18) (94% - 99%)    LABS:                        7.4    24.75 )-----------( 309      ( 02 Aug 2018 05:43 )             26.5     02 Aug 2018 05:43    142    |  108    |  56     ----------------------------<  165    4.5     |  27     |  2.00     Ca    8.2        02 Aug 2018 05:43  PT/INR - ( 02 Aug 2018 05:43 )   PT: 22.5 sec;   INR: 2.05 ratio      Urinalysis Basic - ( 01 Aug 2018 15:30 )  Color: Yellow / Appearance: Clear / S.010 / pH: x  Gluc: x / Ketone: Negative  / Bili: Negative / Urobili: Negative mg/dL   Blood: x / Protein: Negative mg/dL / Nitrite: Negative   Leuk Esterase: Negative / RBC: x / WBC x   Sq Epi: x / Non Sq Epi: x / Bacteria: x    RADIOLOGY & ADDITIONAL TESTS:  EXAM:  CT ABDOMEN AND PELVIS IC                        EXAM:  CT CHEST IC                        *** ADDENDUM 2018  ***  There is a left inguinal hernia containing nonobstructed sigmoid colon.   Colonic diverticulosis is noted, without diverticulitis.  *** END OF ADDENDUM 2018  **  PROCEDURE DATE:  2018    INTERPRETATION:  Clinical information: Status post fall 2 days ago with   back pain. On Coumadin.    COMPARISON: None    PROCEDURE:   CT of the Chest,Abdomen and Pelvis was performed with intravenous   contrast.   Imaging was performed through the chest in the arterial phase followed by   imaging of the abdomen and pelvis in the portal venous phase.  Intravenous contrast: 90 ml Omnipaque 350. 10 ml discarded.  Oral contrast:None.  Sagittal and coronal reformats were performed.    FINDINGS:    CHEST:     LOWER NECK: Within normal limits.  AXILLA, MEDIASTINUM AND DELMIS: No lymphadenopathy.  VESSELS: Atherosclerotic arterial calcifications, including the coronary   arteries.  HEART: The heart is enlarged.No pericardial effusion.  PLEURA: Small right pleural effusion, partially loculated with mild   associated pleural thickening, likely indicating chronic effusion. No   pneumothorax.  LUNGS ANDLARGE AIRWAYS: Patent central airways. No nodule, mass or   evidence of contusion. Mild apical septal thickening and groundglass   opacity compatible with mild edema.  CHEST WALL:  Unremarkable    ABDOMEN AND PELVIS:    LIVER: Left hepatic lobe cyst.Scattered subcentimeter hypodense lesions   which are too small to characterize.  SPLEEN: Within normal limits.  PANCREAS: Within normal limits.  GALLBLADDER: Cholelithiasis. 1.4 cm segment of focal wall thickening near   the fundus.  BILE DUCTS: Normal caliber.  ADRENALS: Within normal limits.  KIDNEYS/URETERS: Left renal cyst and 1.4 cm upper pole lesion which is   not well characterized due to beam hardening artifact from the patient's   arms being at his side. No hydronephrosis or stone.    RETROPERITONEUM: No upper abdominal, retroperitoneal or pelvic   lymphadenopathy.    VESSELS:  Atherosclerotic arterial calcifications. Normal caliber aorta.    BOWEL: No bowel obstruction, wall thickening or inflammatory change.   Appendix not identified.  PERITONEUM: No ascites, hemorrhage or pneumoperitoneum.    REPRODUCTIVE ORGANS: Markedly enlarged prostate.  BLADDER: Within normal limits    ABDOMINAL WALL: Within normal limits.  BONES: Acute fracture of the L4 vertebral body with mild depression of   the superior endplate and no retropulsion. Acute nondisplaced fracture of   the right L5 transverse process at its junction with the vertebral body.   Lucency and mild cortical step off of the anterolateral right fourth rib   suspicious for fracture.    IMPRESSION:     Acute fractures of the L4 vertebral body and right L5 transverse process.   Suspicion for nondisplaced right fourth rib fracture.  These findings   were discussed with LAURIE Zheng on .18, 1430 hours.  Small loculated right pleural effusion. Partial right lower lobe   atelectasis.  Mild pulmonary edema.  1.4 cm area of focal gallbladder wall thickening could be due to   adenomyomatosis versus neoplasm.    EXAM:  CT CERVICAL SPINE                        PROCEDURE DATE:  2018    INTERPRETATION:      CT cervical spine without IV contrast        CLINICAL INFORMATION: Fracture, trauma, neck pain.  Neck pain, spinal   stenosis, spondylosis. s/p fall 2 days ago on coumadin s/p fall 2 days ago   on coumadin    TECHNIQUE:  Contiguous axial 2.0 mm sections were obtained through the   cervical spine using a single helical acquisition.  Additional 2 mm   sagittal and coronal reformatted reconstructions of the spine were   obtained.  These additional reformatted images were used to evaluate the   spine for alignment, vertebral fractures and the integrity of the the   posterior elements.   This scan was performed using automatic exposure   control (radiation dose reduction software) to obtain a diagnostic image   quality scan with patient dose as low as reasonably achievable.        FINDINGS:   No prior similar studies are available for review    Cervical vertebral body heights are maintained. No vertebral fracture is   seen. No destructive bone lesion is found.  Alignment is preserved. There   is an osseous fusion extending from C5 through C7.  Facet joints appear   intact and aligned.    Cervical intervertebral disc spaces show multilevel degenerative disc   disease and spondylosis at C2-3 through C7-T1 with loss of disc height   and associated degenerative endplate changes. There is narrowing of the   LEFT C2-3, LEFT C3-4, BILATERAL C4-5, C5-6 and RIGHT C6-7 neural foramina   due to uncovertebral spurring and facet osteophytic hypertrophy.   Degenerative cord impingement is seen at C4-5.   MR would be required to   evaluate the intervertebral discs at higher sensitivity for disc   pathology.    The skull base appears intact.  No neck mass is recognized.  Paraspinal   soft tissues appear intact. Visualized lymph nodes appear to be within   physiologic size limits.           IMPRESSION:   No vertebral fracture is recognized.  Multilevel   degenerative disc disease and spondylosis at C2-3 through C7-T1   with narrowing of the LEFT C2-3, LEFT C3-4, BILATERAL C4-5, C5-6 and RIGHT   C6-7 neural foramina due to uncovertebral spurring and facet osteophytic   hypertrophy. Degenerative cord impingement is seen at C4-5.      PHYSICAL EXAM:  GENERAL: NAD, well-groomed, well-developed  HEAD:  Normocephalic, multiple bruises  EYES: EOMI, PERRLA, conjunctiva and sclera clear  HEENT: Moist mucous membranes  NECK: Supple, No JVD  NERVOUS SYSTEM:  Alert & Oriented X3, Motor Strength 5/5 B/L upper and lower extremities; DTRs 2+ intact and symmetric  CHEST/LUNG: Clear to auscultation bilaterally; No rales, rhonchi, wheezing, or rubs  HEART: Irregular rate and rhythm; No murmurs, rubs, or gallops  ABDOMEN: Soft, Nontender, Nondistended; Bowel sounds present  GENITOURINARY- Voiding, no palpable bladder  EXTREMITIES:  2+ Peripheral Pulses, No clubbing, cyanosis, or edema  MUSCULOSKELTAL- RT shoulder swelling and superficial bruises.   SKIN- multiple hematomas and skin tears  CNS- alert, oriented X3, non focal     MEDICATIONS  (STANDING):  ALBUTerol    0.083% 2.5 milliGRAM(s) Nebulizer every 6 hours  amLODIPine   Tablet 10 milliGRAM(s) Oral daily  docusate sodium 100 milliGRAM(s) Oral three times a day  dorzolamide 2% Ophthalmic Solution 1 Drop(s) Both EYES <User Schedule>  furosemide    Tablet 40 milliGRAM(s) Oral two times a day  latanoprost 0.005% Ophthalmic Solution 1 Drop(s) Both EYES at bedtime  levothyroxine 200 MICROGram(s) Oral daily  lidocaine   Patch 1 Patch Transdermal daily  multivitamin 1 Tablet(s) Oral daily  oxyCODONE  ER Tablet 10 milliGRAM(s) Oral every 12 hours  pantoprazole    Tablet 40 milliGRAM(s) Oral before breakfast  polyethylene glycol 3350 17 Gram(s) Oral two times a day  predniSONE   Tablet 10 milliGRAM(s) Oral two times a day  warfarin 2.5 milliGRAM(s) Oral daily    MEDICATIONS  (PRN):  acetaminophen   Tablet 650 milliGRAM(s) Oral every 6 hours PRN For Temp greater than 38 C (100.4 F)  ALBUTerol    0.083% 2.5 milliGRAM(s) Nebulizer every 3 hours PRN Shortness of Breath and/or Wheezing  bisacodyl 5 milliGRAM(s) Oral every 12 hours PRN Constipation  ondansetron Injectable 4 milliGRAM(s) IV Push every 6 hours PRN Nausea  oxyCODONE    IR 5 milliGRAM(s) Oral every 4 hours PRN Moderate Pain (4 - 6)  oxyCODONE    IR 10 milliGRAM(s) Oral every 4 hours PRN Severe Pain (7 - 10)    Assessment/Plan  #S/p mechanical fall with L4 compression fracture/L5 transverse process fracture/?rib fracture  Trauma eval appreciated  IN a lot of pain- pain meds optimized, will f/u  Spine eval appreciated- for TLSO brace  Lidoderm patch  Cont PT- for JEREMY  incentive spirometry  Bowel regimen    #Anemia likely acute blood loss from trauma and multiple soft tissue hematomas on chronic disease  Patient denies active GI bleed  Iron-deficient  Transfuse 1 Unit PRBC today  May need non-urgent outpatient GI work up for chronic iron-deficiency anemia when out of rehab    #Thrombocytosis- likely reactive    #Leukocytosis worse  Patient does not look toxic  ID eval for further advise    #COPD on chr prednisone  Cont maint dose of Prednisone  Inhalers prn    #Chr afib on coumadin  Cont coumadin, dose to keep INR therapeutic    #Likely baseline CKD (probably stage 3)  Previous CR back in 2016- 2.0  Monitor BMP    #Acute on Chr diastolic CHF  Imaging studies show mild pulm congestion. Clinically patient looks compensated  S/p IV Lasix, acute component better  Would maintain PO Lasix 40mg BID  Cardio eval appreciated    #BPH- monitor for voiding difficulties    #Dispo- TLSO brace, cont PT. Transfuse. Likely JEREMY tomorrow PCP- DR Aditya Small    CC- s/p mechanical fall 2 days ago    HPI:  89yo/M with PMH afib on coumadin, CKD with unknown baseline creatinine, chr diastolic CHF, COPD on chronic prednisone, BPH presented as trauma after he sustained fall 2 days ago and was unable to get up and ambulate after that. Patient states that  night he tripped over the blanket and fell, he could not reach alert system to call for help and was on the floor until the morning. Yesterday, he had a lot of back pain and could not move, so today he came in to the hospital. C/o RT shoulder pain and back pain. Admitted to trauma, medical consult called for medical co-management    PMH- as above  PSH- denies  Soc hx- denies smoking, lives at home, walks with walker  Fam hx- both parents are     18- pt c/o RT shoulder and back pain  18 no acute events, tele- afib rate controlled  18 states pain is not controlled. Got OOB to chair with PT    Review of system- All 10 systems reviewed and is as per HPI otherwise negative.     Vital Signs Last 24 Hrs  T(C): 36.6 (02 Aug 2018 11:18), Max: 37.4 (01 Aug 2018 23:54)  T(F): 97.9 (02 Aug 2018 11:18), Max: 99.4 (01 Aug 2018 23:54)  HR: 82 (02 Aug 2018 11:18) (68 - 95)  BP: 120/48 (02 Aug 2018 11:18) (115/83 - 128/46)  BP(mean): 61 (02 Aug 2018 11:18) (61 - 61)  RR: 16 (02 Aug 2018 11:18) (16 - 18)  SpO2: 98% (02 Aug 2018 11:18) (94% - 99%)    LABS:                        7.4    24.75 )-----------( 309      ( 02 Aug 2018 05:43 )             26.5     02 Aug 2018 05:43    142    |  108    |  56     ----------------------------<  165    4.5     |  27     |  2.00     Ca    8.2        02 Aug 2018 05:43  PT/INR - ( 02 Aug 2018 05:43 )   PT: 22.5 sec;   INR: 2.05 ratio      Urinalysis Basic - ( 01 Aug 2018 15:30 )  Color: Yellow / Appearance: Clear / S.010 / pH: x  Gluc: x / Ketone: Negative  / Bili: Negative / Urobili: Negative mg/dL   Blood: x / Protein: Negative mg/dL / Nitrite: Negative   Leuk Esterase: Negative / RBC: x / WBC x   Sq Epi: x / Non Sq Epi: x / Bacteria: x    RADIOLOGY & ADDITIONAL TESTS:  EXAM:  CT ABDOMEN AND PELVIS IC                        EXAM:  CT CHEST IC                        *** ADDENDUM 2018  ***  There is a left inguinal hernia containing nonobstructed sigmoid colon.   Colonic diverticulosis is noted, without diverticulitis.  *** END OF ADDENDUM 2018  **  PROCEDURE DATE:  2018    INTERPRETATION:  Clinical information: Status post fall 2 days ago with   back pain. On Coumadin.    COMPARISON: None    PROCEDURE:   CT of the Chest,Abdomen and Pelvis was performed with intravenous   contrast.   Imaging was performed through the chest in the arterial phase followed by   imaging of the abdomen and pelvis in the portal venous phase.  Intravenous contrast: 90 ml Omnipaque 350. 10 ml discarded.  Oral contrast:None.  Sagittal and coronal reformats were performed.    FINDINGS:    CHEST:     LOWER NECK: Within normal limits.  AXILLA, MEDIASTINUM AND DELMIS: No lymphadenopathy.  VESSELS: Atherosclerotic arterial calcifications, including the coronary   arteries.  HEART: The heart is enlarged.No pericardial effusion.  PLEURA: Small right pleural effusion, partially loculated with mild   associated pleural thickening, likely indicating chronic effusion. No   pneumothorax.  LUNGS ANDLARGE AIRWAYS: Patent central airways. No nodule, mass or   evidence of contusion. Mild apical septal thickening and groundglass   opacity compatible with mild edema.  CHEST WALL:  Unremarkable    ABDOMEN AND PELVIS:    LIVER: Left hepatic lobe cyst.Scattered subcentimeter hypodense lesions   which are too small to characterize.  SPLEEN: Within normal limits.  PANCREAS: Within normal limits.  GALLBLADDER: Cholelithiasis. 1.4 cm segment of focal wall thickening near   the fundus.  BILE DUCTS: Normal caliber.  ADRENALS: Within normal limits.  KIDNEYS/URETERS: Left renal cyst and 1.4 cm upper pole lesion which is   not well characterized due to beam hardening artifact from the patient's   arms being at his side. No hydronephrosis or stone.    RETROPERITONEUM: No upper abdominal, retroperitoneal or pelvic   lymphadenopathy.    VESSELS:  Atherosclerotic arterial calcifications. Normal caliber aorta.    BOWEL: No bowel obstruction, wall thickening or inflammatory change.   Appendix not identified.  PERITONEUM: No ascites, hemorrhage or pneumoperitoneum.    REPRODUCTIVE ORGANS: Markedly enlarged prostate.  BLADDER: Within normal limits    ABDOMINAL WALL: Within normal limits.  BONES: Acute fracture of the L4 vertebral body with mild depression of   the superior endplate and no retropulsion. Acute nondisplaced fracture of   the right L5 transverse process at its junction with the vertebral body.   Lucency and mild cortical step off of the anterolateral right fourth rib   suspicious for fracture.    IMPRESSION:     Acute fractures of the L4 vertebral body and right L5 transverse process.   Suspicion for nondisplaced right fourth rib fracture.  These findings   were discussed with LAURIE Zheng on .18, 1430 hours.  Small loculated right pleural effusion. Partial right lower lobe   atelectasis.  Mild pulmonary edema.  1.4 cm area of focal gallbladder wall thickening could be due to   adenomyomatosis versus neoplasm.    EXAM:  CT CERVICAL SPINE                        PROCEDURE DATE:  2018    INTERPRETATION:      CT cervical spine without IV contrast        CLINICAL INFORMATION: Fracture, trauma, neck pain.  Neck pain, spinal   stenosis, spondylosis. s/p fall 2 days ago on coumadin s/p fall 2 days ago   on coumadin    TECHNIQUE:  Contiguous axial 2.0 mm sections were obtained through the   cervical spine using a single helical acquisition.  Additional 2 mm   sagittal and coronal reformatted reconstructions of the spine were   obtained.  These additional reformatted images were used to evaluate the   spine for alignment, vertebral fractures and the integrity of the the   posterior elements.   This scan was performed using automatic exposure   control (radiation dose reduction software) to obtain a diagnostic image   quality scan with patient dose as low as reasonably achievable.        FINDINGS:   No prior similar studies are available for review    Cervical vertebral body heights are maintained. No vertebral fracture is   seen. No destructive bone lesion is found.  Alignment is preserved. There   is an osseous fusion extending from C5 through C7.  Facet joints appear   intact and aligned.    Cervical intervertebral disc spaces show multilevel degenerative disc   disease and spondylosis at C2-3 through C7-T1 with loss of disc height   and associated degenerative endplate changes. There is narrowing of the   LEFT C2-3, LEFT C3-4, BILATERAL C4-5, C5-6 and RIGHT C6-7 neural foramina   due to uncovertebral spurring and facet osteophytic hypertrophy.   Degenerative cord impingement is seen at C4-5.   MR would be required to   evaluate the intervertebral discs at higher sensitivity for disc   pathology.    The skull base appears intact.  No neck mass is recognized.  Paraspinal   soft tissues appear intact. Visualized lymph nodes appear to be within   physiologic size limits.           IMPRESSION:   No vertebral fracture is recognized.  Multilevel   degenerative disc disease and spondylosis at C2-3 through C7-T1   with narrowing of the LEFT C2-3, LEFT C3-4, BILATERAL C4-5, C5-6 and RIGHT   C6-7 neural foramina due to uncovertebral spurring and facet osteophytic   hypertrophy. Degenerative cord impingement is seen at C4-5.      EXAM:  HAND 2VIEWS LT                        PROCEDURE DATE:  2018    INTERPRETATION:      Radiographs of the LEFT hand         CLINICAL INFORMATION:  Injury Pain.    TECHNIQUE:  Frontal, oblique and lateral views of the hand were obtained.    FINDINGS:   No prior examinations are available for review.    The osseous structures of the hand demonstrate an impacted transverse   fracture at the base of the fifth metacarpal. Associated soft tissue   swelling is noted.   Joint spacesare narrowed consistent with mild   osteoarthritis.   No radiopaque foreign body is seen.          IMPRESSION:   impacted transverse fracture at the base of the fifth   metacarpal. Associated soft tissue swelling is noted..        PHYSICAL EXAM:  GENERAL: NAD, well-groomed, well-developed  HEAD:  Normocephalic, multiple bruises  EYES: EOMI, PERRLA, conjunctiva and sclera clear  HEENT: Moist mucous membranes  NECK: Supple, No JVD  NERVOUS SYSTEM:  Alert & Oriented X3, Motor Strength 5/5 B/L upper and lower extremities; DTRs 2+ intact and symmetric  CHEST/LUNG: Clear to auscultation bilaterally; No rales, rhonchi, wheezing, or rubs  HEART: Irregular rate and rhythm; No murmurs, rubs, or gallops  ABDOMEN: Soft, Nontender, Nondistended; Bowel sounds present  GENITOURINARY- Voiding, no palpable bladder  EXTREMITIES:  2+ Peripheral Pulses, No clubbing, cyanosis, or edema  MUSCULOSKELTAL- RT shoulder swelling and superficial bruises.   SKIN- multiple hematomas and skin tears  CNS- alert, oriented X3, non focal     MEDICATIONS  (STANDING):  ALBUTerol    0.083% 2.5 milliGRAM(s) Nebulizer every 6 hours  amLODIPine   Tablet 10 milliGRAM(s) Oral daily  docusate sodium 100 milliGRAM(s) Oral three times a day  dorzolamide 2% Ophthalmic Solution 1 Drop(s) Both EYES <User Schedule>  furosemide    Tablet 40 milliGRAM(s) Oral two times a day  latanoprost 0.005% Ophthalmic Solution 1 Drop(s) Both EYES at bedtime  levothyroxine 200 MICROGram(s) Oral daily  lidocaine   Patch 1 Patch Transdermal daily  multivitamin 1 Tablet(s) Oral daily  oxyCODONE  ER Tablet 10 milliGRAM(s) Oral every 12 hours  pantoprazole    Tablet 40 milliGRAM(s) Oral before breakfast  polyethylene glycol 3350 17 Gram(s) Oral two times a day  predniSONE   Tablet 10 milliGRAM(s) Oral two times a day  warfarin 2.5 milliGRAM(s) Oral daily    MEDICATIONS  (PRN):  acetaminophen   Tablet 650 milliGRAM(s) Oral every 6 hours PRN For Temp greater than 38 C (100.4 F)  ALBUTerol    0.083% 2.5 milliGRAM(s) Nebulizer every 3 hours PRN Shortness of Breath and/or Wheezing  bisacodyl 5 milliGRAM(s) Oral every 12 hours PRN Constipation  ondansetron Injectable 4 milliGRAM(s) IV Push every 6 hours PRN Nausea  oxyCODONE    IR 5 milliGRAM(s) Oral every 4 hours PRN Moderate Pain (4 - 6)  oxyCODONE    IR 10 milliGRAM(s) Oral every 4 hours PRN Severe Pain (7 - 10)    Assessment/Plan  #S/p mechanical fall with L4 compression fracture/L5 transverse process fracture/?rib fracture/ impacted LT 5th metacarpal fracture  Trauma eval appreciated  Ortho/Hand eval for LT fifth metacarpal fracture  IN a lot of pain- pain meds optimized, will f/u  Spine eval appreciated- for TLSO brace  Lidoderm patch  Cont PT- for JEREMY  incentive spirometry  Bowel regimen    #Anemia likely acute blood loss from trauma and multiple soft tissue hematomas on chronic disease  Patient denies active GI bleed  Iron-deficient  Transfuse 1 Unit PRBC today  May need non-urgent outpatient GI work up for chronic iron-deficiency anemia when out of rehab    #Thrombocytosis- likely reactive    #Leukocytosis worse  Patient does not look toxic  ID eval for further advise    #COPD on chr prednisone  Cont maint dose of Prednisone  Inhalers prn    #Chr afib on coumadin  Cont coumadin, dose to keep INR therapeutic    #Likely baseline CKD (probably stage 3)  Previous CR back in 2016- 2.0  Monitor BMP    #Acute on Chr diastolic CHF  Imaging studies show mild pulm congestion. Clinically patient looks compensated  S/p IV Lasix, acute component better  Would maintain PO Lasix 40mg BID  Cardio eval appreciated    #BPH- monitor for voiding difficulties    #Dispo- Hand eval, TLSO brace, cont PT. Transfuse. Likely JEREMY tomorrow

## 2018-08-02 NOTE — PROCEDURE NOTE - NSINFORMCONSENT_GEN_A_CORE
Benefits, risks, and possible complications of procedure explained to patient/caregiver who verbalized understanding and gave written consent./HCP/son: Juan Diego sullivan at bedside

## 2018-08-02 NOTE — CONSULT NOTE ADULT - ASSESSMENT
89yo M w/ L 1st MCP Fx  L spica splint applied at bedside, keep c/d/i  ice and elevate PRN  will FU post-reduction films  Pain control  DVT PPx  WBAT LUE  Pt to be discussed w/ Dr. Puga. Will advise if plan changes.  Ortho Stable 89yo M w/ L 1st MC Fx sp splint application    Pain control  DVT PPx  encourage ambulation for dvt ppx  NWB left hand LUE  keep splint c/d/i  encourage ROM fingers/elbow/shoulder  imaging reviewed  conservative mgmt  no acute orthopedic surgical intervention at this rtime  planned discussed with pt and son (HCP) at bedside all questions answered  pt reports they do not want to wear splint but stressed necessity for fx healing and pt currently compliant and agrees  discussed with dr. laurent and agrees with plan above  c/w medical team mgmt  care as per ortho spine team for [facility]  fu w/ dr laurent in 1 week  Ortho Stable

## 2018-08-02 NOTE — CONSULT NOTE ADULT - SUBJECTIVE AND OBJECTIVE BOX
Patient is a 89y old  Male who presents with a chief complaint of back pain s/p fall, 18 (2018 15:30)    HPI:  89yo/M with PMH afib on coumadin, CKD with unknown baseline creatinine, chr diastolic CHF, COPD on chronic prednisone, BPH presented  as trauma after he sustained fall 2 days prior to admit and was unable to get up and ambulate after that. Patient states that  he tripped over the blanket and fell, he could not reach alert system to call for help and was on the floor until the morning. Here, found to have L4 vertebral body fracture, L5 transverse process fx, R 4th rib fx, and L 5th metcarpal fx, Wbc ct noted to be 21 on admit and now 24. Pt on chronic steroids, noted with multiple hematomas/bruising as result recent fall. CT chest shows small loculated R pleural effusion/atelectasis, No resp complaints.       PMH: as above  PSH: as above  Meds: per reconciliation sheet, noted below  MEDICATIONS  (STANDING):  ALBUTerol    0.083% 2.5 milliGRAM(s) Nebulizer every 6 hours  amLODIPine   Tablet 10 milliGRAM(s) Oral daily  docusate sodium 100 milliGRAM(s) Oral three times a day  dorzolamide 2% Ophthalmic Solution 1 Drop(s) Both EYES <User Schedule>  furosemide    Tablet 40 milliGRAM(s) Oral two times a day  latanoprost 0.005% Ophthalmic Solution 1 Drop(s) Both EYES at bedtime  levothyroxine 200 MICROGram(s) Oral daily  lidocaine   Patch 1 Patch Transdermal daily  multivitamin 1 Tablet(s) Oral daily  oxyCODONE  ER Tablet 10 milliGRAM(s) Oral every 12 hours  pantoprazole    Tablet 40 milliGRAM(s) Oral before breakfast  polyethylene glycol 3350 17 Gram(s) Oral two times a day  predniSONE   Tablet 10 milliGRAM(s) Oral two times a day  warfarin 2.5 milliGRAM(s) Oral daily    Allergies    No Known Allergies    Intolerances      Social: no smoking, no alcohol, no illegal drugs; no recent travel, no exposure to TB  FAMILY HISTORY:  Family history unknown     no history of premature cardiovascular disease in first degree relatives    ROS: the patient denies fever, no chills, no HA, no dizziness, no sore throat, no blurry vision, no CP, no palpitations,  no abdominal pain, no diarrhea, no N/V, no dysuria, no leg pain, no claudication, no rectal pain or bleeding, no night sweats  All other systems reviewed and are negative    Vital Signs Last 24 Hrs  T(C): 36.4 (02 Aug 2018 14:55), Max: 37.4 (01 Aug 2018 23:54)  T(F): 97.6 (02 Aug 2018 14:55), Max: 99.4 (01 Aug 2018 23:54)  HR: 74 (02 Aug 2018 14:55) (68 - 95)  BP: 168/77 (02 Aug 2018 14:55) (115/83 - 168/77)  BP(mean): 61 (02 Aug 2018 11:18) (61 - 61)  RR: 17 (02 Aug 2018 14:55) (16 - 18)  SpO2: 98% (02 Aug 2018 14:55) (94% - 99%)  Daily     Daily Weight in k.6 (02 Aug 2018 09:03)    PE:    Constitutional: frail looking  HEENT: NC/AT, EOMI, PERRLA, conjunctivae clear; ears and nose atraumatic; pharynx benign  Neck: supple; thyroid not palpable  Back: no tenderness  Respiratory: decreased breath sounds  Cardiovascular: S1S2 regular, no murmurs  Abdomen: soft, not tender, not distended, positive BS; liver and spleen WNL  Genitourinary: no suprapubic tenderness  Lymphatic: no LN palpable  Musculoskeletal: no muscle tenderness, no joint swelling or tenderness  Extremities: extensive bruising/eacchymosis hematomas along upper extremities  Neurological/ Psychiatric: AxOx3, Judgement and insight normal;  moving all extremities  Skin: venous stasis changes LEs, bruising, no palpable lesions    Labs: all available labs reviewed                        7.4    24.75 )-----------( 309      ( 02 Aug 2018 05:43 )             26.5     08-02    142  |  108  |  56<H>  ----------------------------<  165<H>  4.5   |  27  |  2.00<H>    Ca    8.2<L>      02 Aug 2018 05:43         Urinalysis Basic - ( 01 Aug 2018 15:30 )    Color: Yellow / Appearance: Clear / S.010 / pH: x  Gluc: x / Ketone: Negative  / Bili: Negative / Urobili: Negative mg/dL   Blood: x / Protein: Negative mg/dL / Nitrite: Negative   Leuk Esterase: Negative / RBC: x / WBC x   Sq Epi: x / Non Sq Epi: x / Bacteria: x          Radiology: all available radiological tests reviewed  < from: CT Chest w/ IV Cont (18 @ 13:27) >  EXAM:  CT ABDOMEN AND PELVIS IC                          EXAM:  CT CHEST IC                            *** ADDENDUM 2018  ***    There is a left inguinal hernia containing nonobstructed sigmoid colon.   Colonic diverticulosis is noted, without diverticulitis.      *** END OF ADDENDUM 2018  ***      PROCEDURE DATE:  2018          INTERPRETATION:  Clinical information: Status post fall 2 days ago with   back pain. On Coumadin.    COMPARISON: None    PROCEDURE:   CT of the Chest,Abdomen and Pelvis was performed with intravenous   contrast.   Imaging was performed through the chest in the arterial phase followed by   imaging of the abdomen and pelvis in the portal venous phase.  Intravenous contrast: 90 ml Omnipaque 350. 10 ml discarded.  Oral contrast:None.  Sagittal and coronal reformats were performed.    FINDINGS:    CHEST:     LOWER NECK: Within normal limits.  AXILLA, MEDIASTINUM AND DELMIS: No lymphadenopathy.  VESSELS: Atherosclerotic arterial calcifications, including the coronary   arteries.  HEART: The heart is enlarged.No pericardial effusion.  PLEURA: Small right pleural effusion, partially loculated with mild   associated pleural thickening, likely indicating chronic effusion. No   pneumothorax.  LUNGS ANDLARGE AIRWAYS: Patent central airways. No nodule, mass or   evidence of contusion. Mild apical septal thickening and groundglass   opacity compatible with mild edema.  CHEST WALL:  Unremarkable    ABDOMEN AND PELVIS:    LIVER: Left hepatic lobe cyst.Scattered subcentimeter hypodense lesions   which are too small to characterize.  SPLEEN: Within normal limits.  PANCREAS: Within normal limits.  GALLBLADDER: Cholelithiasis. 1.4 cm segment of focal wall thickening near   the fundus.  BILE DUCTS: Normal caliber.  ADRENALS: Within normal limits.  KIDNEYS/URETERS: Left renal cyst and 1.4 cm upper pole lesion which is   not well characterized due to beam hardening artifact from the patient's   arms being at his side. No hydronephrosis or stone.    RETROPERITONEUM: No upper abdominal, retroperitoneal or pelvic   lymphadenopathy.    VESSELS:  Atherosclerotic arterial calcifications. Normal caliber aorta.    BOWEL: No bowel obstruction, wall thickening or inflammatory change.   Appendix not identified.  PERITONEUM: No ascites, hemorrhage or pneumoperitoneum.    REPRODUCTIVE ORGANS: Markedly enlarged prostate.  BLADDER: Within normal limits    ABDOMINAL WALL: Within normal limits.  BONES: Acute fracture of the L4 vertebral body with mild depression of   the superior endplate and no retropulsion. Acute nondisplaced fracture of   the right L5 transverse process at its junction with the vertebral body.   Lucency and mild cortical step off of the anterolateral right fourth rib   suspicious for fracture.    IMPRESSION:     Acute fractures of the L4 vertebral body and right L5 transverse process.   Suspicion for nondisplaced right fourth rib fracture.  These findings   were discussed with LAURIE Zheng on 18, 1430 hours.    Small loculated right pleural effusion. Partial right lower lobe   atelectasis.    Mild pulmonary edema.    1.4 cm area of focal gallbladder wall thickening could be due to   adenomyomatosis versus neoplasm.        ***Please see the addendum at the top of this report. It may contain   additional important information or changes.****    < end of copied text >  < from: Xray Hand 2 Views, Left (18 @ 20:09) >  EXAM:  HAND 2VIEWS LT                            *** ADDENDUM 2018  ***    Addendum: The findings and impression should read that the impacted   transverse fracture at the base of the first metacarpal (not the fifth).      *** END OF GWAPEPSH65/02/2018  ***      PROCEDURE DATE:  2018          INTERPRETATION:      Radiographs of the LEFT hand         CLINICAL INFORMATION:  Injury Pain.    TECHNIQUE:  Frontal, oblique and lateral views of the hand were obtained.    FINDINGS:   No prior examinations are available for review.    The osseous structures of the hand demonstrate an impacted transverse   fracture at the base of the fifth metacarpal. Associated soft tissue   swelling is noted.   Joint spaces are narrowed consistent with mild   osteoarthritis.   No radiopaque foreign body is seen.          IMPRESSION:   impacted transverse fracture at the base of the fifth   metacarpal. Associated soft tissue swelling is noted..            < end of copied text >    Advanced directives addressed: full resuscitation

## 2018-08-02 NOTE — PROGRESS NOTE ADULT - SUBJECTIVE AND OBJECTIVE BOX
Patient seen and evaluated,   Continues to have significant pain in lower lumbar and rib region.   Known L4 vertebral body fx, R L5 TP fx  Non surgical treatment.   LSO brace for comfort at bedside   Standing Xray lumbar with corset on when able to tolerate   Continue conservative management

## 2018-08-02 NOTE — PROCEDURE NOTE - PROCEDURE
<<-----Click on this checkbox to enter Procedure Splinting of finger of left hand  08/02/2018    Active  [id number]

## 2018-08-02 NOTE — CONSULT NOTE ADULT - CONSULT REASON
L 1st MCP Fx
L4 vertebral body fx
S/p fall
chronic afib , COPD  fall
hx A. Fib. s/p fall fx ribs and lumbar spine, anticoagulation management.
leukocytosis

## 2018-08-02 NOTE — CONSULT NOTE ADULT - CONSULT REQUESTED DATE/TIME
01-Aug-2018 00:33
01-Aug-2018 09:32
02-Aug-2018 16:03
31-Jul-2018 16:50
31-Jul-2018 17:09
02-Aug-2018

## 2018-08-02 NOTE — PROGRESS NOTE ADULT - SUBJECTIVE AND OBJECTIVE BOX
HPI:    Pt s.p mechanical fall from standing height at home where he lives alone, on 7/29/18. Pt remained on the floor for several hours prior to being assisted by his son. Pt did not seek medical attention until 7/31/18. Pt found to have lumbar fractures, right rib fracture, correlating to region of pain complaints        Patient is a 89y old  Male who presents with a chief complaint of back pain s/p fall, 7/29/18 (31 Jul 2018 15:30)  pt found to have  Acute fractures of the L4 vertebral body and right L5 transverse process.   Suspicion for nondisplaced right fourth rib fracture.  Small loculated right pleural effusion. Partial right lower lobe   atelectasis. Mild pulmonary edema.  1.4 cm area of focal gallbladder wall thickening could be due to   adenomyomatosis versus neoplasm on 7-31-18    Consulted by Dr. Mariely Garza for VTE prophylaxis, risk stratification, and anticoagulation management.    PAST MEDICAL & SURGICAL HISTORY:  Chronic back pain  COPD (chronic obstructive pulmonary disease)  Kidney disease: since 1 year  Hypothyroidism  Occult GI bleeding  Atrial fibrillation  Diastolic heart failure  Hypertension  History of cataract extraction, unspecified laterality    IMPROVE VTE Risk Score: 1    [id number] Score: 4    IMPROVE Bleeding Risk Score: 8  high    Falls Risk:   High ( x )  Mod (  )  Low (  )  crcl:29  cr 1.88  8-1-18 Pt seen at bedside on 3[facility].  Pt in bed states his back pain has decreased because he had pain meds.  Discussed his anticoagulation with coumadin state has been on coumadin for about 30 years and he take 5mg then 2.5mg for two days. managed by Dr. Quintana in [place]. No concerns.  Made him aware of his INR  and his decreased h/h.  Will hold today and give him heparin  sq.  Pt v/u of the need.           FAMILY HISTORY:  Family history unknown    Denies any personal or familial history of clotting or bleeding disorders.    Allergies    No Known Allergies    Intolerances        REVIEW OF SYSTEMS    (  )Fever	     (  )Constipation	(  )SOB				(  )Headache	(  )Dysuria  (  )Chills	     (  )Melena	(  )Dyspnea present on exertion	                    (  )Dizziness                    (  )Polyuria  (  )Nausea	     (  )Hematochezia	(  )Cough			                    (  )Syncope   	(  )Hematuria  (  )Vomiting    (  )Chest Pain	(  )Wheezing			(  )Weakness  (  )Diarrhea     (  )Palpitations	(  )Anorexia			(  )Myalgia    All other review of systems negative: Yes      PHYSICAL EXAM:    Constitutional: Appears Well    Neurological: A& O x 3, ecchymotic area noted to left forehead.    Skin: Warm    Respiratory and Thorax: normal effort; Breath sounds: normal; No rales/wheezing/rhonchi  	  Cardiovascular: S1, S2, regular, NMBR	    Gastrointestinal: BS + x 4Q, nontender	    Genitourinary:  Bladder nondistended, nontender    Musculoskeletal:   General Right:   no muscle/joint tenderness,   normal tone, no joint swelling, dsg noted to rt elbow pt states he bleeds easily when he hits his body.  ROM: limited	    General Left:   no muscle/joint tenderness,   normal tone, no joint swelling,   ROM: limitedl      Lower extrems:   Right: no calf tenderness              negative suma's sign               + pedal pulses, + le edema 2+    Left:   no calf tenderness              negative suma's sign               + pedal pulses, + le edema noted 2+                                7.4    24.75 )-----------( 309      ( 02 Aug 2018 05:43 )             26.5       08-02    142  |  108  |  56<H>  ----------------------------<  165<H>  4.5   |  27  |  2.00<H>    Ca    8.2<L>      02 Aug 2018 05:43    TPro  5.9<L>  /  Alb  2.6<L>  /  TBili  0.7  /  DBili  x   /  AST  24  /  ALT  29  /  AlkPhos  93  07-31       PTT - ( 31 Jul 2018 11:50 )  PTT:29.8 sec                    7.4    16.68 )-----------( 476      ( 01 Aug 2018 06:32 )             26.9       08-01    144  |  108  |  50<H>  ----------------------------<  91  4.5   |  28  |  1.88<H>    Ca    8.3<L>      01 Aug 2018 06:32    TPro  5.9<L>  /  Alb  2.6<L>  /  TBili  0.7  /  DBili  x   /  AST  24  /  ALT  29  /  AlkPhos  93  07-31              PT/INR - ( 02 Aug 2018 05:43 )   PT: 22.5 sec;   INR: 2.05 ratio    PT/INR - ( 01 Aug 2018 06:32 )   PT: 15.8 sec;   INR: 1.45 ratio         PTT - ( 31 Jul 2018 11:50 )  PTT:29.8 sec		    		    IMPRESSION: CT CHEST WITH IV CONTRAST 7-31-18    Acute fractures of the L4 vertebral body and right L5 transverse process.   Suspicion for nondisplaced right fourth rib fracture.  These findings   were discussed with LAURIE Zheng on 7.31.18, 1430 hours.    Small loculated right pleural effusion. Partial right lower lobe   atelectasis.    Mild pulmonary edema.    1.4 cm area of focal gallbladder wall thickening could be due to   adenomyomatosis versus neoplasm    MEDICATIONS  (STANDING):  ALBUTerol    0.083% 2.5 milliGRAM(s) Nebulizer every 6 hours  amLODIPine   Tablet 10 milliGRAM(s) Oral daily  dorzolamide 2% Ophthalmic Solution 1 Drop(s) Both EYES <User Schedule>  furosemide    Tablet 40 milliGRAM(s) Oral two times a day  heparin  Injectable 5000 Unit(s) SubCutaneous every 12 hours  latanoprost 0.005% Ophthalmic Solution 1 Drop(s) Both EYES at bedtime  levothyroxine 200 MICROGram(s) Oral daily  lidocaine   Patch 1 Patch Transdermal daily  multivitamin 1 Tablet(s) Oral daily  pantoprazole    Tablet 40 milliGRAM(s) Oral before breakfast  predniSONE   Tablet 10 milliGRAM(s) Oral two times a day      Vital Signs Last 24 Hrs  T(C): 36.7 (08-02-18 @ 04:46), Max: 37.4 (08-01-18 @ 23:54)  T(F): 98.1 (08-02-18 @ 04:46), Max: 99.4 (08-01-18 @ 23:54)  HR: 68 (08-02-18 @ 09:40) (68 - 95)  BP: 119/54 (08-02-18 @ 04:46) (115/83 - 128/46)  BP(mean): --  RR: 18 (08-02-18 @ 04:46) (18 - 18)  SpO2: 96% (08-02-18 @ 09:40) (94% - 99%)      DVT Prophylaxis:  LMWH                   (  )  Heparin SQ           (X  )  Coumadin             (  )  Xarelto                  (  )  Eliquis                   (  )  Venodynes           (X  )  Ambulation          ( X )  UFH                       (  )  Contraindicated  (  )

## 2018-08-03 ENCOUNTER — TRANSCRIPTION ENCOUNTER (OUTPATIENT)
Age: 83
End: 2018-08-03

## 2018-08-03 VITALS — WEIGHT: 193.12 LBS

## 2018-08-03 LAB
ANION GAP SERPL CALC-SCNC: 8 MMOL/L — SIGNIFICANT CHANGE UP (ref 5–17)
BUN SERPL-MCNC: 59 MG/DL — HIGH (ref 7–23)
CALCIUM SERPL-MCNC: 8.3 MG/DL — LOW (ref 8.5–10.1)
CHLORIDE SERPL-SCNC: 104 MMOL/L — SIGNIFICANT CHANGE UP (ref 96–108)
CO2 SERPL-SCNC: 26 MMOL/L — SIGNIFICANT CHANGE UP (ref 22–31)
CREAT SERPL-MCNC: 1.98 MG/DL — HIGH (ref 0.5–1.3)
GLUCOSE SERPL-MCNC: 168 MG/DL — HIGH (ref 70–99)
HCT VFR BLD CALC: 30.5 % — LOW (ref 39–50)
HGB BLD-MCNC: 8.7 G/DL — LOW (ref 13–17)
INR BLD: 1.83 RATIO — HIGH (ref 0.88–1.16)
MCHC RBC-ENTMCNC: 22 PG — LOW (ref 27–34)
MCHC RBC-ENTMCNC: 28.5 GM/DL — LOW (ref 32–36)
MCV RBC AUTO: 77.2 FL — LOW (ref 80–100)
NRBC # BLD: 0 /100 WBCS — SIGNIFICANT CHANGE UP (ref 0–0)
PLATELET # BLD AUTO: 311 K/UL — SIGNIFICANT CHANGE UP (ref 150–400)
POTASSIUM SERPL-MCNC: 4.6 MMOL/L — SIGNIFICANT CHANGE UP (ref 3.5–5.3)
POTASSIUM SERPL-SCNC: 4.6 MMOL/L — SIGNIFICANT CHANGE UP (ref 3.5–5.3)
PROTHROM AB SERPL-ACNC: 20 SEC — HIGH (ref 9.8–12.7)
RBC # BLD: 3.95 M/UL — LOW (ref 4.2–5.8)
RBC # FLD: 18.8 % — HIGH (ref 10.3–14.5)
SODIUM SERPL-SCNC: 138 MMOL/L — SIGNIFICANT CHANGE UP (ref 135–145)
WBC # BLD: 20.14 K/UL — HIGH (ref 3.8–10.5)
WBC # FLD AUTO: 20.14 K/UL — HIGH (ref 3.8–10.5)

## 2018-08-03 PROCEDURE — 99233 SBSQ HOSP IP/OBS HIGH 50: CPT

## 2018-08-03 PROCEDURE — 99239 HOSP IP/OBS DSCHRG MGMT >30: CPT

## 2018-08-03 RX ORDER — DICLOFENAC SODIUM 30 MG/G
1 GEL TOPICAL
Qty: 0 | Refills: 0 | COMMUNITY

## 2018-08-03 RX ORDER — DOCUSATE SODIUM 100 MG
1 CAPSULE ORAL
Qty: 0 | Refills: 0 | COMMUNITY
Start: 2018-08-03

## 2018-08-03 RX ORDER — LIDOCAINE 4 G/100G
1 CREAM TOPICAL
Qty: 0 | Refills: 0 | COMMUNITY
Start: 2018-08-03

## 2018-08-03 RX ORDER — OXYCODONE HYDROCHLORIDE 5 MG/1
1 TABLET ORAL
Qty: 0 | Refills: 0 | COMMUNITY
Start: 2018-08-03

## 2018-08-03 RX ORDER — PANTOPRAZOLE SODIUM 20 MG/1
1 TABLET, DELAYED RELEASE ORAL
Qty: 0 | Refills: 0 | COMMUNITY
Start: 2018-08-03

## 2018-08-03 RX ORDER — ALBUTEROL 90 UG/1
1 AEROSOL, METERED ORAL
Qty: 0 | Refills: 0 | COMMUNITY
Start: 2018-08-03

## 2018-08-03 RX ADMIN — ALBUTEROL 2.5 MILLIGRAM(S): 90 AEROSOL, METERED ORAL at 13:46

## 2018-08-03 RX ADMIN — POLYETHYLENE GLYCOL 3350 17 GRAM(S): 17 POWDER, FOR SOLUTION ORAL at 07:52

## 2018-08-03 RX ADMIN — PANTOPRAZOLE SODIUM 40 MILLIGRAM(S): 20 TABLET, DELAYED RELEASE ORAL at 07:51

## 2018-08-03 RX ADMIN — Medication 1 TABLET(S): at 11:21

## 2018-08-03 RX ADMIN — Medication 100 MILLIGRAM(S): at 07:52

## 2018-08-03 RX ADMIN — DORZOLAMIDE HYDROCHLORIDE 1 DROP(S): 20 SOLUTION/ DROPS OPHTHALMIC at 11:20

## 2018-08-03 RX ADMIN — Medication 200 MICROGRAM(S): at 07:51

## 2018-08-03 RX ADMIN — OXYCODONE HYDROCHLORIDE 5 MILLIGRAM(S): 5 TABLET ORAL at 13:53

## 2018-08-03 RX ADMIN — Medication 40 MILLIGRAM(S): at 07:51

## 2018-08-03 RX ADMIN — OXYCODONE HYDROCHLORIDE 10 MILLIGRAM(S): 5 TABLET ORAL at 07:51

## 2018-08-03 RX ADMIN — AMLODIPINE BESYLATE 10 MILLIGRAM(S): 2.5 TABLET ORAL at 07:52

## 2018-08-03 RX ADMIN — OXYCODONE HYDROCHLORIDE 10 MILLIGRAM(S): 5 TABLET ORAL at 07:56

## 2018-08-03 RX ADMIN — Medication 10 MILLIGRAM(S): at 07:51

## 2018-08-03 NOTE — DISCHARGE NOTE ADULT - NS AS ACTIVITY OBS
take all appropriate fall risk precautions/Do not make important decisions/Showering allowed/Stairs allowed/No Heavy lifting/straining/Do not drive or operate machinery/Bathing allowed/Walking-Indoors allowed/Walking-Outdoors allowed

## 2018-08-03 NOTE — PROGRESS NOTE ADULT - SUBJECTIVE AND OBJECTIVE BOX
HPI: This is a 89 y/o male with pmhx of Afib, dCHF, HTN, hypothyroidism, COPD, occult GI bleed, chronic back pain who sustained mechanical fall, was found on floor for several hours prior to assistance by son. He did not seek medical attention until 7/31/18. Pt is found to have acute lumbar fractures of L4 vertebral body and right L5 transverse process, right 4th rib fracture, correlating to region of pain complaints.     8-1-18 Pt seen at bedside on 3[facility].  Pt in bed states his back pain has decreased because he had pain meds.  Discussed his anticoagulation with coumadin state has been on coumadin for about 30 years and he take 5mg then 2.5mg for two days. managed by Dr. Quintana in [place]. No concerns.  Made him aware of his INR  and his decreased h/h.  Will hold today and give him heparin  sq.  Pt v/u of the need.   8/3/18: Pt seen at bedside, OOB to chair. [facility], but pt reports that he feels unready. Discussed his INR result of 1.83 today, and will resume his home dose regimen.     Patient is a 89y old  Male who presents with a chief complaint of back pain s/p fall, 7/29/18 (31 Jul 2018 15:30)    Consulted by Dr. Mariely Garza for VTE prophylaxis, risk stratification, and anticoagulation management.    PAST MEDICAL & SURGICAL HISTORY:  Chronic back pain  COPD (chronic obstructive pulmonary disease)  Kidney disease: since 1 year  Hypothyroidism  Occult GI bleeding  Atrial fibrillation  Diastolic heart failure  Hypertension  History of cataract extraction, unspecified laterality    IMPROVE VTE Risk Score: 1    [id number] Score: 4    IMPROVE Bleeding Risk Score: 8  high    Falls Risk:   High ( x )  Mod (  )  Low (  )  crcl:29  cr 1.88    FAMILY HISTORY:  Family history unknown    Denies any personal or familial history of clotting or bleeding disorders.    Allergies    No Known Allergies    Intolerances    REVIEW OF SYSTEMS    (  )Fever	     (  )Constipation	(  )SOB				(  )Headache	(  )Dysuria  (  )Chills	     (  )Melena	(  )Dyspnea present on exertion	                    (  )Dizziness                    (  )Polyuria  (  )Nausea	     (  )Hematochezia	(  )Cough			                    (  )Syncope   	(  )Hematuria  (  )Vomiting    (  )Chest Pain	(  )Wheezing			(  )Weakness  (  )Diarrhea     (  )Palpitations	(  )Anorexia			(  )Myalgia    All other review of systems negative: Yes    PHYSICAL EXAM:    Constitutional: Appears Well    Neurological: A& O x 3, ecchymotic area noted to left forehead.    Skin: Warm    Respiratory and Thorax: normal effort; Breath sounds: normal; No rales/wheezing/rhonchi  	  Cardiovascular: S1, S2, regular, NMBR	    Gastrointestinal: BS + x 4Q, nontender	    Genitourinary:  Bladder nondistended, nontender    Musculoskeletal:   General Right:   no muscle/joint tenderness,   normal tone, no joint swelling, dsg noted to rt elbow pt states he bleeds easily when he hits his body.  ROM: limited	    General Left:   no muscle/joint tenderness,   normal tone, no joint swelling, dressing noted to left elbow  ROM: limited    Lower extrems:   Right: no calf tenderness              negative suma's sign               + pedal pulses, + le edema 1+    Left:   no calf tenderness              negative suma's sign               + pedal pulses, + le edema noted 1+                              8.7    20.14 )-----------( 311      ( 03 Aug 2018 10:10 )             30.5       08-03    138  |  104  |  59<H>  ----------------------------<  168<H>  4.6   |  26  |  1.98<H>    Ca    8.3<L>      03 Aug 2018 10:10                        7.4    24.75 )-----------( 309      ( 02 Aug 2018 05:43 )             26.5       08-02    142  |  108  |  56<H>  ----------------------------<  165<H>  4.5   |  27  |  2.00<H>    Ca    8.2<L>      02 Aug 2018 05:43                  7.4    16.68 )-----------( 476      ( 01 Aug 2018 06:32 )             26.9       08-01    144  |  108  |  50<H>  ----------------------------<  91  4.5   |  28  |  1.88<H>    Ca    8.3<L>      01 Aug 2018 06:32    TPro  5.9<L>  /  Alb  2.6<L>  /  TBili  0.7  /  DBili  x   /  AST  24  /  ALT  29  /  AlkPhos  93  07-31    PT/INR - ( 03 Aug 2018 10:10 )   PT: 20.0 sec;   INR: 1.83 ratio    PT/INR - ( 02 Aug 2018 05:43 )   PT: 22.5 sec;   INR: 2.05 ratio    PT/INR - ( 01 Aug 2018 06:32 )   PT: 15.8 sec;   INR: 1.45 ratio      PTT - ( 31 Jul 2018 11:50 )  PTT:29.8 sec		    IMPRESSION: CT CHEST WITH IV CONTRAST 7-31-18    Acute fractures of the L4 vertebral body and right L5 transverse process.   Suspicion for nondisplaced right fourth rib fracture.  These findings   were discussed with LAURIE Zheng on 7.31.18, 1430 hours.    Small loculated right pleural effusion. Partial right lower lobe   atelectasis.    Mild pulmonary edema.    1.4 cm area of focal gallbladder wall thickening could be due to   adenomyomatosis versus neoplasm    MEDICATIONS  (STANDING):  ALBUTerol    0.083% 2.5 milliGRAM(s) Nebulizer every 6 hours  amLODIPine   Tablet 10 milliGRAM(s) Oral daily  docusate sodium 100 milliGRAM(s) Oral three times a day  dorzolamide 2% Ophthalmic Solution 1 Drop(s) Both EYES <User Schedule>  furosemide    Tablet 40 milliGRAM(s) Oral two times a day  latanoprost 0.005% Ophthalmic Solution 1 Drop(s) Both EYES at bedtime  levothyroxine 200 MICROGram(s) Oral daily  lidocaine   Patch 1 Patch Transdermal daily  multivitamin 1 Tablet(s) Oral daily  oxyCODONE  ER Tablet 10 milliGRAM(s) Oral every 12 hours  pantoprazole    Tablet 40 milliGRAM(s) Oral before breakfast  polyethylene glycol 3350 17 Gram(s) Oral two times a day  predniSONE   Tablet 10 milliGRAM(s) Oral two times a day  warfarin 2.5 milliGRAM(s) Oral daily    Vital Signs Last 24 Hrs  T(C): 36.8 (08-03-18 @ 05:37), Max: 36.8 (08-03-18 @ 05:37)  T(F): 98.2 (08-03-18 @ 05:37), Max: 98.2 (08-03-18 @ 05:37)  HR: 72 (08-03-18 @ 05:37) (70 - 82)  BP: 147/56 (08-03-18 @ 05:37) (120/48 - 168/77)  BP(mean): 61 (08-02-18 @ 11:18) (61 - 61)  RR: 16 (08-03-18 @ 05:37) (16 - 18)  SpO2: 100% (08-03-18 @ 05:37) (96% - 100%)    DVT Prophylaxis:  LMWH                   (  )  Heparin SQ           (  )  Coumadin             ( X )  Xarelto                  (  )  Eliquis                   (  )  Venodynes           (X  )  Ambulation          ( X )  UFH                       (  )  Contraindicated  (  )

## 2018-08-03 NOTE — DISCHARGE NOTE ADULT - CARE PROVIDERS DIRECT ADDRESSES
,DirectAddress_Unknown,[email or web address],DirectAddress_Unknown,[email or web address]

## 2018-08-03 NOTE — DISCHARGE NOTE ADULT - ADDITIONAL INSTRUCTIONS
Please follow up with spine surgeon Dr Mancuso, hand surgeon, Dr Puga, your private cardiologist, and your private GI doctor as outpatient. Please seek immediate medical attention for chest pain, shortness of breath, abdominal pain, or any adverse changes to health Please follow up with spine surgeon Dr Mancuso, hand surgeon, Dr Puga, your private cardiologist and pulmonologist, and your private GI doctor as outpatient. Please seek immediate medical attention for chest pain, shortness of breath, abdominal pain, or any adverse changes to health

## 2018-08-03 NOTE — DISCHARGE NOTE ADULT - CARE PROVIDER_API CALL
Donny Mancuso), Orthopedic Surgery  [address] [place] [address]  Phone: [phone]  Fax: [phone]    Palla, Venugopal R (MD), Cardiovascular Disease; Internal Medicine  [address], [place] [id number]  Phone: [phone]  Fax: [phone]    Michael uPga), Orthopaedic Surgery; Surgery of the Hand  [address] [place] [address]  Phone: [phone]  Fax: [phone]    Donny Perez (), Surgery; Surgical Critical Care  [address]  [address]  [place] [address]  Phone: [phone]  Fax: [phone]

## 2018-08-03 NOTE — DISCHARGE NOTE ADULT - MEDICATION SUMMARY - MEDICATIONS TO TAKE
I will START or STAY ON the medications listed below when I get home from the hospital:    predniSONE 5 mg oral tablet  -- **PREDNISONE TAPER FOR BACK PAIN** 5 tabs qd x 5 days, 4 tabs qd x 5 days, 3 tabs qd x 5 days, 2 tabs qd x 5 days, 1 tab qd x 5 days  -- Indication: For COPD (chronic obstructive pulmonary disease)    aspirin 81 mg oral tablet  -- 1 tab(s) by mouth once a day, As Needed  -- Indication: For Cad    acetaminophen 650 mg oral tablet  -- 2 tab(s) by mouth 2 times a day **  -- Indication: For pain    oxyCODONE 5 mg oral tablet  -- 1 tab(s) by mouth every 4 hours, As needed, Moderate Pain (4 - 6)  -- Indication: For pain    oxyCODONE 10 mg oral tablet  -- 1 tab(s) by mouth every 4 hours, As needed, Severe Pain (7 - 10)  -- Indication: For pain    isosorbide mononitrate 60 mg oral tablet, extended release  -- 1 tab(s) by mouth 2 times a day  -- Indication: For Antianginal    warfarin 2.5 mg oral tablet  -- 1 tab(s) by mouth once a day for two days **Alternates with 5 mg**  -- Indication: For Afib    warfarin 5 mg oral tablet  -- 1 tab(s) by mouth once a day **Alternates after 2 days of Warfarin 2.5 mg**  -- Indication: For Afib    rosuvastatin 10 mg oral tablet  -- 1 tab(s) by mouth once a day (at bedtime)  -- Indication: For Hyperlipidemia    albuterol 2.5 mg/3 mL (0.083%) inhalation solution  -- 1 dose(s) inhaled every 6 hours, As Needed  -- Indication: For COPD (chronic obstructive pulmonary disease)    amLODIPine 10 mg oral tablet  -- 1 tab(s) by mouth once a day  -- Indication: For Htn    lidocaine 5% topical film  -- Apply on skin to affected area once a day  -- Indication: For pain    furosemide 40 mg oral tablet  -- 2 tab(s) by mouth once a day  -- Indication: For Chf    docusate sodium 100 mg oral capsule  -- 1 cap(s) by mouth 3 times a day  -- Indication: For COnstipation    montelukast 10 mg oral tablet  -- 1 tab(s) by mouth once a day (in the evening)  -- Indication: For COPD (chronic obstructive pulmonary disease)    latanoprost 0.005% ophthalmic solution  -- 1 drop(s) in each eye once a day (in the evening)  -- Indication: For opthalmic    Azopt 1% ophthalmic suspension  -- 1 drop(s) in each eye 2 times a day  -- Indication: For opthalmic    pantoprazole 40 mg oral delayed release tablet  -- 1 tab(s) by mouth once a day (before a meal)  -- Indication: For gerd    Synthroid 200 mcg (0.2 mg) oral tablet  -- 1 tab(s) by mouth once a day (in the morning)  -- Indication: For Hypothyroidism    Multiple Vitamins oral tablet  -- 1 tab(s) by mouth once a day  -- Indication: For nutrition    Calcium 600+D oral tablet  -- 1 tab(s) by mouth 2 times a day  -- Indication: For nutrition

## 2018-08-03 NOTE — PROGRESS NOTE ADULT - SUBJECTIVE AND OBJECTIVE BOX
As per Orthopedics request, a Standing Lumbosacral xray with TSLO brace in place. However, pt's family has refused any further diagnostic studies before being discharged to Rehab Facility. As a result, family's request will be documented at this time. Nursing is aware and Dr Garza has been notified.

## 2018-08-03 NOTE — DISCHARGE NOTE ADULT - HOSPITAL COURSE
Pt s/p fall, with resultant left hand fracture, lumbar fracture, rib fracture. Pt stable throughout hospital stay and admission. Pt evaluated by orthopedic hand and spine specialists, cardiology, medical hospitalist, ID. Appropriate outpatient follow up advised and councelled Pt s/p fall, with resultant left hand fracture, lumbar fracture, rib fracture. Pt stable throughout hospital stay and admission. Pt evaluated by orthopedic hand and spine specialists, cardiology, medical hospitalist, ID. Appropriate outpatient follow up advised and councelled    Pt on chronic steroids for COPD

## 2018-08-03 NOTE — PROGRESS NOTE ADULT - PROVIDER SPECIALTY LIST ADULT
Anticoag Management
Hospitalist
Orthopedics
Surgery
Trauma Surgery
Trauma Surgery
Anticoag Management

## 2018-08-03 NOTE — DISCHARGE NOTE ADULT - MEDICATION SUMMARY - MEDICATIONS TO STOP TAKING
I will STOP taking the medications listed below when I get home from the hospital:    NexIUM 40 mg oral delayed release capsule  -- 1 cap(s) by mouth once a day

## 2018-08-03 NOTE — DISCHARGE NOTE ADULT - PATIENT PORTAL LINK FT
You can access the "[employer] Patient Portal, offered by [facility], by registering with the following website: [email or web address]

## 2018-08-03 NOTE — PROGRESS NOTE ADULT - SUBJECTIVE AND OBJECTIVE BOX
CC:Patient is a 89y old  Male who presents with a chief complaint of back pain s/p fall, 7/29/18 (31 Jul 2018 15:30)      Subjective:  Pt seen and examined at bedside with chaperone. Pt is AAOx3, pt in no acute distress. Pt states tolerated rib pain, back pain, left hand pain with meds. Pt denied c/o fever, chills, chest pain, SOB, abd pain, N/V/D, extremity dysfunction, hemoptysis, hematemesis, hematuria, hematochexia, headache, diplopia, vertigo, dizzyness. Pt tolerating diet, (+) void, (+) oob, (+) bowel function, (+) incentive spirometry    ROS:  rib pain, back pain, left hand pain, otherwise negative ROS    Vital Signs Last 24 Hrs  T(C): 36.6 (03 Aug 2018 11:12), Max: 36.8 (03 Aug 2018 05:37)  T(F): 97.8 (03 Aug 2018 11:12), Max: 98.2 (03 Aug 2018 05:37)  HR: 72 (03 Aug 2018 05:37) (70 - 80)  BP: 154/23 (03 Aug 2018 11:12) (128/50 - 168/77)  BP(mean): --  RR: 15 (03 Aug 2018 11:12) (15 - 18)  SpO2: 100% (03 Aug 2018 11:12) (96% - 100%)    Labs:                                8.7    20.14 )-----------( 311      ( 03 Aug 2018 10:10 )             30.5     CBC Full  -  ( 03 Aug 2018 10:10 )  WBC Count : 20.14 K/uL  Hemoglobin : 8.7 g/dL  Hematocrit : 30.5 %  Platelet Count - Automated : 311 K/uL  Mean Cell Volume : 77.2 fl  Mean Cell Hemoglobin : 22.0 pg  Mean Cell Hemoglobin Concentration : 28.5 gm/dL  Auto Neutrophil # : x  Auto Lymphocyte # : x  Auto Monocyte # : x  Auto Eosinophil # : x  Auto Basophil # : x  Auto Neutrophil % : x  Auto Lymphocyte % : x  Auto Monocyte % : x  Auto Eosinophil % : x  Auto Basophil % : x    08-03    138  |  104  |  59<H>  ----------------------------<  168<H>  4.6   |  26  |  1.98<H>    Ca    8.3<L>      03 Aug 2018 10:10        PT/INR - ( 03 Aug 2018 10:10 )   PT: 20.0 sec;   INR: 1.83 ratio               Meds:  acetaminophen   Tablet 650 milliGRAM(s) Oral every 6 hours PRN  ALBUTerol    0.083% 2.5 milliGRAM(s) Nebulizer every 6 hours  ALBUTerol    0.083% 2.5 milliGRAM(s) Nebulizer every 3 hours PRN  amLODIPine   Tablet 10 milliGRAM(s) Oral daily  bisacodyl 5 milliGRAM(s) Oral every 12 hours PRN  docusate sodium 100 milliGRAM(s) Oral three times a day  dorzolamide 2% Ophthalmic Solution 1 Drop(s) Both EYES <User Schedule>  furosemide    Tablet 40 milliGRAM(s) Oral two times a day  latanoprost 0.005% Ophthalmic Solution 1 Drop(s) Both EYES at bedtime  levothyroxine 200 MICROGram(s) Oral daily  lidocaine   Patch 1 Patch Transdermal daily  multivitamin 1 Tablet(s) Oral daily  ondansetron Injectable 4 milliGRAM(s) IV Push every 6 hours PRN  oxyCODONE    IR 5 milliGRAM(s) Oral every 4 hours PRN  oxyCODONE    IR 10 milliGRAM(s) Oral every 4 hours PRN  oxyCODONE  ER Tablet 10 milliGRAM(s) Oral every 12 hours  pantoprazole    Tablet 40 milliGRAM(s) Oral before breakfast  polyethylene glycol 3350 17 Gram(s) Oral two times a day  predniSONE   Tablet 10 milliGRAM(s) Oral two times a day  warfarin 2.5 milliGRAM(s) Oral daily      Radiology:  < from: Xray Hand 2 Views, Left (08.01.18 @ 20:09) >  EXAM:  HAND 2VIEWS LT                            *** ADDENDUM 08/02/2018  ***    Addendum: The findings and impression should read that the impacted   transverse fracture at the base of the first metacarpal (not the fifth).      *** END OF XZRPRPZC01/02/2018  ***      PROCEDURE DATE:  08/01/2018          INTERPRETATION:      Radiographs of the LEFT hand         CLINICAL INFORMATION:  Injury Pain.    TECHNIQUE:  Frontal, oblique and lateral views of the hand were obtained.    FINDINGS:   No prior examinations are available for review.    The osseous structures of the hand demonstrate an impacted transverse   fracture at the base of the fifth metacarpal. Associated soft tissue   swelling is noted.   Joint spaces are narrowed consistent with mild   osteoarthritis.   No radiopaque foreign body is seen.          IMPRESSION:   impacted transverse fracture at the base of the fifth   metacarpal. Associated soft tissue swelling is noted..                  ***Please see the addendum at the top of this report. It may contain   additional important information or changes.****          YUMIKO YANCEY M.D., ATTENDING RADIOLOGIST  This document has been electronically signed. Aug  2 2018 11:28AM  Addend:  LEANNE OSEI M.D., ATTENDING RADIOLOGIST  This addendum was electronically signed on: Aug  2 2018  3:44PM.    < end of copied text >      Physical exam:  GCS of 15  Pt is aaox3  Pt in no acute distress  Airway is patent  Breathing is symmetric and unlabored  CN II-XII grossly intact  HEENT: normocephalic, SPENCER, EOM wnl, (+) left frontal scalp ecchymosis, no gross craniofacial bony pathology to exam  Neck: No tracheal deviation, no JVD, no crepitus, no ecchymosis, no hematoma  Chest: no gross left rib pathology or tenderness to exam. (+) tenderness to right 4th rib region from known fracture pathology. No sternal pathology or tenderness to exam. No crepitus, no ecchymosis, no hematoma.  Spine: Pt in TLSO brace, (+) L4-L5 tenderness from known fracture pathology  Resp: CTAB  CVS: S1S2(+)  ABD: bowel sounds (+), soft, nontender, non distended, no rebound, no guarding, no rigidity, no pelvic instability to exam, non incarcerated non strangulated left inguinal hernia  EXT: left hand in splint per ortho, no calf tenderness or edema to exam b/l, pt has good capillary refill in all digits. Sensoromotor function grossly intact, on VTE prophylaxis  Skin: no adverse skin changes to exam

## 2018-08-03 NOTE — DISCHARGE NOTE ADULT - CARE PLAN
Principal Discharge DX:	Fracture of spine, lumbar, without spinal cord injury, closed, initial encounter  Goal:	neurologic stability  Assessment and plan of treatment:	Please follow up with spine surgeon Dr Mancuso, hand surgeon, Dr Puga, your private cardiologist, and your private GI doctor as outpatient. Please seek immediate medical attention for chest pain, shortness of breath, abdominal pain, or any adverse changes to health Principal Discharge DX:	Fracture of spine, lumbar, without spinal cord injury, closed, initial encounter  Goal:	neurologic stability  Assessment and plan of treatment:	Please follow up with spine surgeon Dr Mancuso, hand surgeon, Dr Puga, your private cardiologist and pulmonologist, and your private GI doctor as outpatient. Please seek immediate medical attention for chest pain, shortness of breath, abdominal pain, or any adverse changes to health

## 2018-08-03 NOTE — PROGRESS NOTE ADULT - SUBJECTIVE AND OBJECTIVE BOX
Pt seen resting on chair. Seen participating in physical therapy sessions. According to physical therapist, Pt has improvement with sessions and is taking "a few steps". Pt has constant low back pain. Pt has chronic pain of the posterior aspect of right thigh unchanged. Pt denies numbness and weakness of b/l lower extremities. He voids on own without complications. Pain is tolerable with current treatment regimen. Pt is wearing brace.    PE  Gen appearance: NAD  Motor strength: 5/5 of b/l lower ext  Sensation: intact  no calf tenderness  Lumbar spine: minimal tenderness to palpation of the lumbar spine paraspinal region.    Plan---L4 vertebral body fx, R L5 TP fx  - Mobilize as tolerated with physical therapy  - Continue bracing  - Standing X-ray with lumbar corset recommended.   - No surgical intervention at this time.

## 2018-08-03 NOTE — DISCHARGE NOTE ADULT - PLAN OF CARE
neurologic stability Please follow up with spine surgeon Dr Mancuso, hand surgeon, Dr Puga, your private cardiologist, and your private GI doctor as outpatient. Please seek immediate medical attention for chest pain, shortness of breath, abdominal pain, or any adverse changes to health Please follow up with spine surgeon Dr Mancuso, hand surgeon, Dr Puga, your private cardiologist and pulmonologist, and your private GI doctor as outpatient. Please seek immediate medical attention for chest pain, shortness of breath, abdominal pain, or any adverse changes to health

## 2018-08-03 NOTE — PROGRESS NOTE ADULT - ASSESSMENT
This is a 89 year old male s/p fall on on 7-29-18, pt presented to ed on 7-31-18 found to have acute lumbar fractures of L4 vertebral body and right L5 transverse process, right 4th rib fracture  Pt has a hx of A. Fib [id number] Score: 4. Pt has morse high thrombosis risk and high bleed risk. Pt being discharge today to [facility].     Discharge PLAN:   INR today 1.45-->2.05-->1.83, will reinstate his home warfarin regimen since being discharged  please have pt get warfarin 5 mg tonight, then warfarin 2.5 mg x 2 days. (his home regimen warfarin 5 mg x 3 days and warfarin 2.5 mg x 4 days) = total 25 mg/week   recheck PT/INR tomorrow at [facility]  encourage mobility as tolerated  maintain venodynes    Will continue to follow.
89 Y old male S/P fall, L4 vertebral body fracture,L5 transverse process fracture  Right 4th rib fracture, Ch pleural effusion.  Medical comorbidities of Atrial fibrillation, Chronic back pain, COPD, Diastolic heart failure, Hypertension, Hypothyroidism, Kidney disease, chronic left inguinal hernia, medicine  following  Cardiology consult  Hospitalist consult for medical management  Spine surgery consult, LSO brace  GI/DVT prophylaxis, A/C per medical service  Pain control  neur checks Q 4  Incentive spirometry  Left  hand x ray   F/U labs, radiologic studies  Fall risk protocol  PT  SW for D/C planning
A/P:  L4 vertebral body fracture  L5 transverse process fracture  Right 4th rib fracture  Left 1st metatarsal fracture  S/P fall at home  Non incarcerated non strangulated left inguinal hernia  Medical comorbidities of Atrial fibrillation, Chronic back pain, COPD, Diastolic heart failure, Hypertension, Hypothyroidism, Kidney disease, chronic left inguinal hernia  Cardiology on consult  Hospitalist on consult for medical management  Hand surgery on consult  Spine surgery on consult  GI/DVT prophylaxis  Pain control  Incentive spirometry  F/U labs, radiologic studies  Fall risk protocol  Pt stable and cleared from surgical standpoint   for d/c planning  Pt aware of and agrees with all of the above
A/P:  Ordered O  [facility] to be called once req. submitted to Materials management.  Incentive spirometer.
This is a 89 year old male s/p fall on on 7-29-18, pt presented to ed on 7-31-18 found to have rx rib and lumbar fx.  Pt has a hx of A. Fib [id number] Score: 4. Pt has morse high thrombosis risk and high bleed risk.    Plan: pt received coumadin 6 mg of coumadin last night, INR1.45----->2.05  will dec to 2.5 mg po tonight  d/c heparin  :daily cbc, bmp, pt/inr  :le venodynes  :oob as tolerated

## 2018-08-03 NOTE — PROGRESS NOTE ADULT - SUBJECTIVE AND OBJECTIVE BOX
PCP- DR Aditya Small    CC- s/p mechanical fall 2 days ago    HPI:  89yo/M with PMH afib on coumadin, CKD with unknown baseline creatinine, chr diastolic CHF, COPD on chronic prednisone, BPH presented as trauma after he sustained fall 2 days ago and was unable to get up and ambulate after that. Patient states that  night he tripped over the blanket and fell, he could not reach alert system to call for help and was on the floor until the morning. Yesterday, he had a lot of back pain and could not move, so today he came in to the hospital. C/o RT shoulder pain and back pain. Admitted to trauma, medical consult called for medical co-management    PMH- as above  PSH- denies  Soc hx- denies smoking, lives at home, walks with walker  Fam hx- both parents are     18- pt c/o RT shoulder and back pain  18 no acute events, tele- afib rate controlled  18 states pain is not controlled. Got OOB to chair with PT  8/3/18 c/o RT shoulder pain    Review of system- All 10 systems reviewed and is as per HPI otherwise negative.     Vital Signs Last 24 Hrs  T(C): 36.6 (03 Aug 2018 11:12), Max: 36.8 (03 Aug 2018 05:37)  T(F): 97.8 (03 Aug 2018 11:12), Max: 98.2 (03 Aug 2018 05:37)  HR: 72 (03 Aug 2018 05:37) (70 - 80)  BP: 154/23 (03 Aug 2018 11:12) (128/50 - 168/77)  BP(mean): --  RR: 15 (03 Aug 2018 11:12) (15 - 18)  SpO2: 100% (03 Aug 2018 11:12) (96% - 100%)    LABS:                        8.7    20.14 )-----------( 311      ( 03 Aug 2018 10:10 )             30.5     03 Aug 2018 10:10    138    |  104    |  59     ----------------------------<  168    4.6     |  26     |  1.98     Ca    8.3        03 Aug 2018 10:10  PT/INR - ( 03 Aug 2018 10:10 )   PT: 20.0 sec;   INR: 1.83 ratio      Urinalysis Basic - ( 01 Aug 2018 15:30 )  Color: Yellow / Appearance: Clear / S.010 / pH: x  Gluc: x / Ketone: Negative  / Bili: Negative / Urobili: Negative mg/dL   Blood: x / Protein: Negative mg/dL / Nitrite: Negative   Leuk Esterase: Negative / RBC: x / WBC x   Sq Epi: x / Non Sq Epi: x / Bacteria: x    RADIOLOGY & ADDITIONAL TESTS:  EXAM:  CT ABDOMEN AND PELVIS IC                        EXAM:  CT CHEST IC                        *** ADDENDUM 2018  ***  There is a left inguinal hernia containing nonobstructed sigmoid colon.   Colonic diverticulosis is noted, without diverticulitis.  *** END OF ADDENDUM 2018  **  PROCEDURE DATE:  2018    INTERPRETATION:  Clinical information: Status post fall 2 days ago with   back pain. On Coumadin.    COMPARISON: None    PROCEDURE:   CT of the Chest,Abdomen and Pelvis was performed with intravenous   contrast.   Imaging was performed through the chest in the arterial phase followed by   imaging of the abdomen and pelvis in the portal venous phase.  Intravenous contrast: 90 ml Omnipaque 350. 10 ml discarded.  Oral contrast:None.  Sagittal and coronal reformats were performed.    FINDINGS:    CHEST:     LOWER NECK: Within normal limits.  AXILLA, MEDIASTINUM AND DELMIS: No lymphadenopathy.  VESSELS: Atherosclerotic arterial calcifications, including the coronary   arteries.  HEART: The heart is enlarged.No pericardial effusion.  PLEURA: Small right pleural effusion, partially loculated with mild   associated pleural thickening, likely indicating chronic effusion. No   pneumothorax.  LUNGS ANDLARGE AIRWAYS: Patent central airways. No nodule, mass or   evidence of contusion. Mild apical septal thickening and groundglass   opacity compatible with mild edema.  CHEST WALL:  Unremarkable    ABDOMEN AND PELVIS:    LIVER: Left hepatic lobe cyst.Scattered subcentimeter hypodense lesions   which are too small to characterize.  SPLEEN: Within normal limits.  PANCREAS: Within normal limits.  GALLBLADDER: Cholelithiasis. 1.4 cm segment of focal wall thickening near   the fundus.  BILE DUCTS: Normal caliber.  ADRENALS: Within normal limits.  KIDNEYS/URETERS: Left renal cyst and 1.4 cm upper pole lesion which is   not well characterized due to beam hardening artifact from the patient's   arms being at his side. No hydronephrosis or stone.    RETROPERITONEUM: No upper abdominal, retroperitoneal or pelvic   lymphadenopathy.    VESSELS:  Atherosclerotic arterial calcifications. Normal caliber aorta.    BOWEL: No bowel obstruction, wall thickening or inflammatory change.   Appendix not identified.  PERITONEUM: No ascites, hemorrhage or pneumoperitoneum.    REPRODUCTIVE ORGANS: Markedly enlarged prostate.  BLADDER: Within normal limits    ABDOMINAL WALL: Within normal limits.  BONES: Acute fracture of the L4 vertebral body with mild depression of   the superior endplate and no retropulsion. Acute nondisplaced fracture of   the right L5 transverse process at its junction with the vertebral body.   Lucency and mild cortical step off of the anterolateral right fourth rib   suspicious for fracture.    IMPRESSION:     Acute fractures of the L4 vertebral body and right L5 transverse process.   Suspicion for nondisplaced right fourth rib fracture.  These findings   were discussed with LAURIE Zheng on .18, 1430 hours.  Small loculated right pleural effusion. Partial right lower lobe   atelectasis.  Mild pulmonary edema.  1.4 cm area of focal gallbladder wall thickening could be due to   adenomyomatosis versus neoplasm.    EXAM:  CT CERVICAL SPINE                        PROCEDURE DATE:  2018    INTERPRETATION:      CT cervical spine without IV contrast        CLINICAL INFORMATION: Fracture, trauma, neck pain.  Neck pain, spinal   stenosis, spondylosis. s/p fall 2 days ago on coumadin s/p fall 2 days ago   on coumadin    TECHNIQUE:  Contiguous axial 2.0 mm sections were obtained through the   cervical spine using a single helical acquisition.  Additional 2 mm   sagittal and coronal reformatted reconstructions of the spine were   obtained.  These additional reformatted images were used to evaluate the   spine for alignment, vertebral fractures and the integrity of the the   posterior elements.   This scan was performed using automatic exposure   control (radiation dose reduction software) to obtain a diagnostic image   quality scan with patient dose as low as reasonably achievable.        FINDINGS:   No prior similar studies are available for review    Cervical vertebral body heights are maintained. No vertebral fracture is   seen. No destructive bone lesion is found.  Alignment is preserved. There   is an osseous fusion extending from C5 through C7.  Facet joints appear   intact and aligned.    Cervical intervertebral disc spaces show multilevel degenerative disc   disease and spondylosis at C2-3 through C7-T1 with loss of disc height   and associated degenerative endplate changes. There is narrowing of the   LEFT C2-3, LEFT C3-4, BILATERAL C4-5, C5-6 and RIGHT C6-7 neural foramina   due to uncovertebral spurring and facet osteophytic hypertrophy.   Degenerative cord impingement is seen at C4-5.   MR would be required to   evaluate the intervertebral discs at higher sensitivity for disc   pathology.    The skull base appears intact.  No neck mass is recognized.  Paraspinal   soft tissues appear intact. Visualized lymph nodes appear to be within   physiologic size limits.           IMPRESSION:   No vertebral fracture is recognized.  Multilevel   degenerative disc disease and spondylosis at C2-3 through C7-T1   with narrowing of the LEFT C2-3, LEFT C3-4, BILATERAL C4-5, C5-6 and RIGHT   C6-7 neural foramina due to uncovertebral spurring and facet osteophytic   hypertrophy. Degenerative cord impingement is seen at C4-5.      EXAM:  HAND 2VIEWS LT                        PROCEDURE DATE:  2018    INTERPRETATION:      Radiographs of the LEFT hand         CLINICAL INFORMATION:  Injury Pain.    TECHNIQUE:  Frontal, oblique and lateral views of the hand were obtained.    FINDINGS:   No prior examinations are available for review.    The osseous structures of the hand demonstrate an impacted transverse   fracture at the base of the fifth metacarpal. Associated soft tissue   swelling is noted.   Joint spacesare narrowed consistent with mild   osteoarthritis.   No radiopaque foreign body is seen.          IMPRESSION:   impacted transverse fracture at the base of the fifth   metacarpal. Associated soft tissue swelling is noted..        PHYSICAL EXAM:  GENERAL: NAD, well-groomed, well-developed  HEAD:  Normocephalic, multiple bruises  EYES: EOMI, PERRLA, conjunctiva and sclera clear  HEENT: Moist mucous membranes  NECK: Supple, No JVD  NERVOUS SYSTEM:  Alert & Oriented X3, Motor Strength 5/5 B/L upper and lower extremities; DTRs 2+ intact and symmetric  CHEST/LUNG: Clear to auscultation bilaterally; No rales, rhonchi, wheezing, or rubs  HEART: Irregular rate and rhythm; No murmurs, rubs, or gallops  ABDOMEN: Soft, Nontender, Nondistended; Bowel sounds present  GENITOURINARY- Voiding, no palpable bladder  EXTREMITIES:  2+ Peripheral Pulses, No clubbing, cyanosis, or edema  MUSCULOSKELTAL- RT shoulder swelling and superficial bruises.   SKIN- multiple hematomas and skin tears  CNS- alert, oriented X3, non focal     MEDICATIONS  (STANDING):  ALBUTerol    0.083% 2.5 milliGRAM(s) Nebulizer every 6 hours  amLODIPine   Tablet 10 milliGRAM(s) Oral daily  docusate sodium 100 milliGRAM(s) Oral three times a day  dorzolamide 2% Ophthalmic Solution 1 Drop(s) Both EYES <User Schedule>  furosemide    Tablet 40 milliGRAM(s) Oral two times a day  latanoprost 0.005% Ophthalmic Solution 1 Drop(s) Both EYES at bedtime  levothyroxine 200 MICROGram(s) Oral daily  lidocaine   Patch 1 Patch Transdermal daily  multivitamin 1 Tablet(s) Oral daily  oxyCODONE  ER Tablet 10 milliGRAM(s) Oral every 12 hours  pantoprazole    Tablet 40 milliGRAM(s) Oral before breakfast  polyethylene glycol 3350 17 Gram(s) Oral two times a day  predniSONE   Tablet 10 milliGRAM(s) Oral two times a day  warfarin 2.5 milliGRAM(s) Oral daily    MEDICATIONS  (PRN):  acetaminophen   Tablet 650 milliGRAM(s) Oral every 6 hours PRN For Temp greater than 38 C (100.4 F)  ALBUTerol    0.083% 2.5 milliGRAM(s) Nebulizer every 3 hours PRN Shortness of Breath and/or Wheezing  bisacodyl 5 milliGRAM(s) Oral every 12 hours PRN Constipation  ondansetron Injectable 4 milliGRAM(s) IV Push every 6 hours PRN Nausea  oxyCODONE    IR 5 milliGRAM(s) Oral every 4 hours PRN Moderate Pain (4 - 6)  oxyCODONE    IR 10 milliGRAM(s) Oral every 4 hours PRN Severe Pain (7 - 10)    Assessment/Plan  #S/p mechanical fall with L4 compression fracture/L5 transverse process fracture/?rib fracture/ impacted LT 5th metacarpal fracture  Trauma eval appreciated  Ortho/Hand eval for LT fifth metacarpal fracture appreciated- in splint  pain meds optimized  Spine eval appreciated- TLSO brace on  Lidoderm patch  Cont PT- for [facility]  incentive spirometry  Bowel regimen    #Anemia likely acute blood loss from trauma and multiple soft tissue hematomas on chronic disease  Patient denies active GI bleed  Iron-deficient  S/p 1 Unit PRBC transfusion-  stable for discharge  May need non-urgent outpatient GI work up for chronic iron-deficiency anemia when out of rehab    #Thrombocytosis- likely reactive    #Leukocytosis better  Patient does not look toxic  ID eval appreciated, agree to observe off antibiotics    #COPD on chr prednisone  Cont maint dose of Prednisone  Inhalers prn    #Chr afib on coumadin  Cont coumadin, dose to keep INR therapeutic    #Likely baseline CKD (probably stage 3)  Previous CR back in 2016- 2.0  Monitor BMP    #Acute on Chr diastolic CHF  Imaging studies show mild pulm congestion. Clinically patient looks compensated  S/p IV Lasix, acute component better  Would maintain PO Lasix 40mg BID  Cardio eval appreciated    #BPH- monitor for voiding difficulties    #Dispo- [facility] today

## 2018-08-08 DIAGNOSIS — N18.3 CHRONIC KIDNEY DISEASE, STAGE 3 (MODERATE): ICD-10-CM
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
## 2018-08-08 DIAGNOSIS — D47.3 ESSENTIAL (HEMORRHAGIC) THROMBOCYTHEMIA: ICD-10-CM

## 2018-08-08 DIAGNOSIS — Z79.01 LONG TERM (CURRENT) USE OF ANTICOAGULANTS: ICD-10-CM

## 2018-08-08 DIAGNOSIS — S32.059A UNSPECIFIED FRACTURE OF FIFTH LUMBAR VERTEBRA, INITIAL ENCOUNTER FOR CLOSED FRACTURE: ICD-10-CM

## 2018-08-08 DIAGNOSIS — S22.31XA FRACTURE OF ONE RIB, RIGHT SIDE, INITIAL ENCOUNTER FOR CLOSED FRACTURE: ICD-10-CM

## 2018-08-08 DIAGNOSIS — D62 ACUTE POSTHEMORRHAGIC ANEMIA: ICD-10-CM

## 2018-08-08 DIAGNOSIS — J98.11 ATELECTASIS: ICD-10-CM

## 2018-08-08 DIAGNOSIS — S32.049A UNSPECIFIED FRACTURE OF FOURTH LUMBAR VERTEBRA, INITIAL ENCOUNTER FOR CLOSED FRACTURE: ICD-10-CM

## 2018-08-08 DIAGNOSIS — I48.2 CHRONIC ATRIAL FIBRILLATION: ICD-10-CM

## 2018-08-08 DIAGNOSIS — I13.0 HYPERTENSIVE HEART AND CHRONIC KIDNEY DISEASE WITH HEART FAILURE AND STAGE 1 THROUGH STAGE 4 CHRONIC KIDNEY DISEASE, OR UNSPECIFIED CHRONIC KIDNEY DISEASE: ICD-10-CM

## 2018-08-08 DIAGNOSIS — N40.0 BENIGN PROSTATIC HYPERPLASIA WITHOUT LOWER URINARY TRACT SYMPTOMS: ICD-10-CM

## 2018-08-08 DIAGNOSIS — Z79.899 OTHER LONG TERM (CURRENT) DRUG THERAPY: ICD-10-CM

## 2018-08-08 DIAGNOSIS — I50.33 ACUTE ON CHRONIC DIASTOLIC (CONGESTIVE) HEART FAILURE: ICD-10-CM

## 2018-08-08 DIAGNOSIS — J44.9 CHRONIC OBSTRUCTIVE PULMONARY DISEASE, UNSPECIFIED: ICD-10-CM
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
## 2018-08-08 DIAGNOSIS — S62.92XA UNSPECIFIED FRACTURE OF LEFT WRIST AND HAND, INITIAL ENCOUNTER FOR CLOSED FRACTURE: ICD-10-CM

## 2018-09-10 ENCOUNTER — INPATIENT (INPATIENT)
Facility: HOSPITAL | Age: 83
LOS: 2 days | Discharge: HOSPICE | End: 2018-09-13
Attending: HOSPITALIST | Admitting: HOSPITALIST
Payer: COMMERCIAL

## 2018-09-10 VITALS
WEIGHT: 184.97 LBS | HEIGHT: 76 IN | HEART RATE: 68 BPM | RESPIRATION RATE: 18 BRPM | OXYGEN SATURATION: 95 % | DIASTOLIC BLOOD PRESSURE: 48 MMHG | SYSTOLIC BLOOD PRESSURE: 104 MMHG

## 2018-09-10 DIAGNOSIS — Z98.49 CATARACT EXTRACTION STATUS, UNSPECIFIED EYE: Chronic | ICD-10-CM

## 2018-09-10 PROBLEM — M54.9 DORSALGIA, UNSPECIFIED: Chronic | Status: ACTIVE | Noted: 2018-07-31

## 2018-09-10 PROBLEM — J44.9 CHRONIC OBSTRUCTIVE PULMONARY DISEASE, UNSPECIFIED: Chronic | Status: ACTIVE | Noted: 2018-07-31

## 2018-09-10 LAB
ALBUMIN SERPL ELPH-MCNC: 2 G/DL — LOW (ref 3.3–5)
ALP SERPL-CCNC: 111 U/L — SIGNIFICANT CHANGE UP (ref 40–120)
ALT FLD-CCNC: 25 U/L — SIGNIFICANT CHANGE UP (ref 12–78)
ANION GAP SERPL CALC-SCNC: 10 MMOL/L — SIGNIFICANT CHANGE UP (ref 5–17)
ANISOCYTOSIS BLD QL: SLIGHT — SIGNIFICANT CHANGE UP
APPEARANCE UR: CLEAR — SIGNIFICANT CHANGE UP
APTT BLD: 47 SEC — HIGH (ref 27.5–37.4)
AST SERPL-CCNC: 30 U/L — SIGNIFICANT CHANGE UP (ref 15–37)
BACTERIA # UR AUTO: ABNORMAL
BASOPHILS # BLD AUTO: 0.1 K/UL — SIGNIFICANT CHANGE UP (ref 0–0.2)
BASOPHILS NFR BLD AUTO: 0.5 % — SIGNIFICANT CHANGE UP (ref 0–2)
BILIRUB SERPL-MCNC: 0.4 MG/DL — SIGNIFICANT CHANGE UP (ref 0.2–1.2)
BILIRUB UR-MCNC: ABNORMAL
BUN SERPL-MCNC: 85 MG/DL — HIGH (ref 7–23)
CALCIUM SERPL-MCNC: 8.4 MG/DL — LOW (ref 8.5–10.1)
CHLORIDE SERPL-SCNC: 99 MMOL/L — SIGNIFICANT CHANGE UP (ref 96–108)
CO2 SERPL-SCNC: 24 MMOL/L — SIGNIFICANT CHANGE UP (ref 22–31)
COLOR SPEC: YELLOW — SIGNIFICANT CHANGE UP
CREAT SERPL-MCNC: 3.31 MG/DL — HIGH (ref 0.5–1.3)
DIFF PNL FLD: ABNORMAL
ELLIPTOCYTES BLD QL SMEAR: SIGNIFICANT CHANGE UP
EOSINOPHIL # BLD AUTO: 0.41 K/UL — SIGNIFICANT CHANGE UP (ref 0–0.5)
EOSINOPHIL NFR BLD AUTO: 2.1 % — SIGNIFICANT CHANGE UP (ref 0–6)
EPI CELLS # UR: SIGNIFICANT CHANGE UP
GLUCOSE SERPL-MCNC: 109 MG/DL — HIGH (ref 70–99)
GLUCOSE UR QL: NEGATIVE MG/DL — SIGNIFICANT CHANGE UP
HCT VFR BLD CALC: 30.6 % — LOW (ref 39–50)
HGB BLD-MCNC: 8.9 G/DL — LOW (ref 13–17)
IMM GRANULOCYTES NFR BLD AUTO: 1.2 % — SIGNIFICANT CHANGE UP (ref 0–1.5)
INR BLD: 2.27 RATIO — HIGH (ref 0.88–1.16)
KETONES UR-MCNC: NEGATIVE — SIGNIFICANT CHANGE UP
LACTATE SERPL-SCNC: 1.9 MMOL/L — SIGNIFICANT CHANGE UP (ref 0.7–2)
LEUKOCYTE ESTERASE UR-ACNC: ABNORMAL
LYMPHOCYTES # BLD AUTO: 0.68 K/UL — LOW (ref 1–3.3)
LYMPHOCYTES # BLD AUTO: 3.4 % — LOW (ref 13–44)
MACROCYTES BLD QL: SLIGHT — SIGNIFICANT CHANGE UP
MANUAL SMEAR VERIFICATION: SIGNIFICANT CHANGE UP
MCHC RBC-ENTMCNC: 22.3 PG — LOW (ref 27–34)
MCHC RBC-ENTMCNC: 29.1 GM/DL — LOW (ref 32–36)
MCV RBC AUTO: 76.7 FL — LOW (ref 80–100)
MICROCYTES BLD QL: SLIGHT — SIGNIFICANT CHANGE UP
MONOCYTES # BLD AUTO: 1.29 K/UL — HIGH (ref 0–0.9)
MONOCYTES NFR BLD AUTO: 6.5 % — SIGNIFICANT CHANGE UP (ref 2–14)
NEUTROPHILS # BLD AUTO: 17.03 K/UL — HIGH (ref 1.8–7.4)
NEUTROPHILS NFR BLD AUTO: 86.3 % — HIGH (ref 43–77)
NITRITE UR-MCNC: NEGATIVE — SIGNIFICANT CHANGE UP
NRBC # BLD: 0 /100 WBCS — SIGNIFICANT CHANGE UP (ref 0–0)
OVALOCYTES BLD QL SMEAR: SLIGHT — SIGNIFICANT CHANGE UP
PH UR: 5 — SIGNIFICANT CHANGE UP (ref 5–8)
PLAT MORPH BLD: NORMAL — SIGNIFICANT CHANGE UP
PLATELET # BLD AUTO: 336 K/UL — SIGNIFICANT CHANGE UP (ref 150–400)
POIKILOCYTOSIS BLD QL AUTO: SLIGHT — SIGNIFICANT CHANGE UP
POLYCHROMASIA BLD QL SMEAR: SLIGHT — SIGNIFICANT CHANGE UP
POTASSIUM SERPL-MCNC: 5.2 MMOL/L — SIGNIFICANT CHANGE UP (ref 3.5–5.3)
POTASSIUM SERPL-SCNC: 5.2 MMOL/L — SIGNIFICANT CHANGE UP (ref 3.5–5.3)
PROT SERPL-MCNC: 6.1 GM/DL — SIGNIFICANT CHANGE UP (ref 6–8.3)
PROT UR-MCNC: NEGATIVE MG/DL — SIGNIFICANT CHANGE UP
PROTHROM AB SERPL-ACNC: 24.9 SEC — HIGH (ref 9.8–12.7)
RBC # BLD: 3.99 M/UL — LOW (ref 4.2–5.8)
RBC # FLD: 20.5 % — HIGH (ref 10.3–14.5)
RBC BLD AUTO: ABNORMAL
RBC CASTS # UR COMP ASSIST: ABNORMAL /HPF (ref 0–4)
SCHISTOCYTES BLD QL AUTO: SIGNIFICANT CHANGE UP
SODIUM SERPL-SCNC: 133 MMOL/L — LOW (ref 135–145)
SP GR SPEC: 1.01 — SIGNIFICANT CHANGE UP (ref 1.01–1.02)
TROPONIN I SERPL-MCNC: <0.015 NG/ML — SIGNIFICANT CHANGE UP (ref 0.01–0.04)
UROBILINOGEN FLD QL: NEGATIVE MG/DL — SIGNIFICANT CHANGE UP
WBC # BLD: 19.74 K/UL — HIGH (ref 3.8–10.5)
WBC # FLD AUTO: 19.74 K/UL — HIGH (ref 3.8–10.5)
WBC UR QL: ABNORMAL

## 2018-09-10 PROCEDURE — 99285 EMERGENCY DEPT VISIT HI MDM: CPT

## 2018-09-10 PROCEDURE — 93010 ELECTROCARDIOGRAM REPORT: CPT

## 2018-09-10 PROCEDURE — 71045 X-RAY EXAM CHEST 1 VIEW: CPT | Mod: 26

## 2018-09-10 PROCEDURE — 74176 CT ABD & PELVIS W/O CONTRAST: CPT | Mod: 26

## 2018-09-10 RX ORDER — FUROSEMIDE 40 MG
2 TABLET ORAL
Qty: 0 | Refills: 0 | COMMUNITY

## 2018-09-10 RX ORDER — SODIUM CHLORIDE 9 MG/ML
2500 INJECTION INTRAMUSCULAR; INTRAVENOUS; SUBCUTANEOUS ONCE
Qty: 0 | Refills: 0 | Status: COMPLETED | OUTPATIENT
Start: 2018-09-10 | End: 2018-09-10

## 2018-09-10 RX ORDER — CEFEPIME 1 G/1
1000 INJECTION, POWDER, FOR SOLUTION INTRAMUSCULAR; INTRAVENOUS ONCE
Qty: 0 | Refills: 0 | Status: COMPLETED | OUTPATIENT
Start: 2018-09-10 | End: 2018-09-10

## 2018-09-10 RX ORDER — WARFARIN SODIUM 2.5 MG/1
1 TABLET ORAL
Qty: 0 | Refills: 0 | COMMUNITY

## 2018-09-10 RX ORDER — LEVOTHYROXINE SODIUM 125 MCG
1 TABLET ORAL
Qty: 0 | Refills: 0 | COMMUNITY

## 2018-09-10 RX ORDER — MONTELUKAST 4 MG/1
1 TABLET, CHEWABLE ORAL
Qty: 0 | Refills: 0 | COMMUNITY

## 2018-09-10 RX ORDER — ISOSORBIDE MONONITRATE 60 MG/1
1 TABLET, EXTENDED RELEASE ORAL
Qty: 0 | Refills: 0 | COMMUNITY

## 2018-09-10 RX ORDER — VANCOMYCIN HCL 1 G
1000 VIAL (EA) INTRAVENOUS ONCE
Qty: 0 | Refills: 0 | Status: COMPLETED | OUTPATIENT
Start: 2018-09-10 | End: 2018-09-10

## 2018-09-10 RX ORDER — AMLODIPINE BESYLATE 2.5 MG/1
1 TABLET ORAL
Qty: 0 | Refills: 0 | COMMUNITY

## 2018-09-10 RX ADMIN — SODIUM CHLORIDE 2500 MILLILITER(S): 9 INJECTION INTRAMUSCULAR; INTRAVENOUS; SUBCUTANEOUS at 23:38

## 2018-09-10 RX ADMIN — SODIUM CHLORIDE 2500 MILLILITER(S): 9 INJECTION INTRAMUSCULAR; INTRAVENOUS; SUBCUTANEOUS at 15:35

## 2018-09-10 RX ADMIN — Medication 1000 MILLIGRAM(S): at 18:41

## 2018-09-10 RX ADMIN — CEFEPIME 1000 MILLIGRAM(S): 1 INJECTION, POWDER, FOR SOLUTION INTRAMUSCULAR; INTRAVENOUS at 17:41

## 2018-09-10 RX ADMIN — Medication 250 MILLIGRAM(S): at 17:41

## 2018-09-10 NOTE — ED ADULT TRIAGE NOTE - CHIEF COMPLAINT QUOTE
Pt. to the ED BIBA from NH C/O Elevated Kidney Function and to be evaluated for urinary symptoms and lab work

## 2018-09-10 NOTE — ED ADULT NURSE NOTE - NS PRO AD PATIENT TYPE
Medical Orders for Life-Sustaining Treatment (MOLST) Medical Orders for Life-Sustaining Treatment (MOLST)/Health Care Proxy (HCP)/Do Not Resuscitate (DNR)

## 2018-09-10 NOTE — ED ADULT NURSE NOTE - NSIMPLEMENTINTERV_GEN_ALL_ED
Implemented All Fall with Harm Risk Interventions:  [place] to call system. Call bell, personal items and telephone within reach. Instruct patient to call for assistance. Room bathroom lighting operational. Non-slip footwear when patient is off stretcher. Physically safe environment: no spills, clutter or unnecessary equipment. Stretcher in lowest position, wheels locked, appropriate side rails in place. Provide visual cue, wrist band, yellow gown, etc. Monitor gait and stability. Monitor for mental status changes and reorient to person, place, and time. Review medications for side effects contributing to fall risk. Reinforce activity limits and safety measures with patient and family. Provide visual clues: red socks.

## 2018-09-10 NOTE — ED ADULT NURSE REASSESSMENT NOTE - COMFORT CARE
assisted in cleaning and changing patients brief and sheets. patient is clean and dry at this time. hourly rounding completed/repositioned/assisted to bathroom/side rails up

## 2018-09-10 NOTE — ED PROVIDER NOTE - NS_ ATTENDINGSCRIBEDETAILS _ED_A_ED_FT
I, Syd Montoya MD,  performed the initial face to face bedside interview with this patient regarding history of present illness, review of symptoms and relevant past medical, social and family history.  I completed an independent physical examination.    The history, relevant review of systems, past medical and surgical history, medical decision making, and physical examination was documented by the scribe in my presence and I attest to the accuracy of the documentation.

## 2018-09-10 NOTE — ED PROVIDER NOTE - OBJECTIVE STATEMENT
89 y/o male with a PMHx of Afib, COPD, chronic back pain, HTN, HLD presents to the ED c/o urinary frequency, elevated WBC since today. +mild SOB Pt had a fall at home and had a  shoulder a week ago. Denies fever.

## 2018-09-10 NOTE — ED ADULT NURSE NOTE - OBJECTIVE STATEMENT
[facility] bib [facility]. pt bib for urinary infection.   generalized weeping edema, including bilateral arms and legs.

## 2018-09-11 DIAGNOSIS — I38 ENDOCARDITIS, VALVE UNSPECIFIED: ICD-10-CM

## 2018-09-11 DIAGNOSIS — E03.9 HYPOTHYROIDISM, UNSPECIFIED: ICD-10-CM

## 2018-09-11 DIAGNOSIS — I83.009 VARICOSE VEINS OF UNSPECIFIED LOWER EXTREMITY WITH ULCER OF UNSPECIFIED SITE: ICD-10-CM

## 2018-09-11 DIAGNOSIS — N17.9 ACUTE KIDNEY FAILURE, UNSPECIFIED: ICD-10-CM

## 2018-09-11 DIAGNOSIS — I10 ESSENTIAL (PRIMARY) HYPERTENSION: ICD-10-CM

## 2018-09-11 DIAGNOSIS — I48.91 UNSPECIFIED ATRIAL FIBRILLATION: ICD-10-CM

## 2018-09-11 DIAGNOSIS — Z29.9 ENCOUNTER FOR PROPHYLACTIC MEASURES, UNSPECIFIED: ICD-10-CM

## 2018-09-11 DIAGNOSIS — J18.9 PNEUMONIA, UNSPECIFIED ORGANISM: ICD-10-CM

## 2018-09-11 DIAGNOSIS — J44.9 CHRONIC OBSTRUCTIVE PULMONARY DISEASE, UNSPECIFIED: ICD-10-CM

## 2018-09-11 DIAGNOSIS — M54.9 DORSALGIA, UNSPECIFIED: ICD-10-CM

## 2018-09-11 DIAGNOSIS — I50.30 UNSPECIFIED DIASTOLIC (CONGESTIVE) HEART FAILURE: ICD-10-CM

## 2018-09-11 LAB
ANION GAP SERPL CALC-SCNC: 10 MMOL/L — SIGNIFICANT CHANGE UP (ref 5–17)
ANISOCYTOSIS BLD QL: SLIGHT — SIGNIFICANT CHANGE UP
BASOPHILS # BLD AUTO: 0 K/UL — SIGNIFICANT CHANGE UP (ref 0–0.2)
BASOPHILS NFR BLD AUTO: 0 % — SIGNIFICANT CHANGE UP (ref 0–2)
BUN SERPL-MCNC: 82 MG/DL — HIGH (ref 7–23)
CALCIUM SERPL-MCNC: 8.1 MG/DL — LOW (ref 8.5–10.1)
CHLORIDE SERPL-SCNC: 103 MMOL/L — SIGNIFICANT CHANGE UP (ref 96–108)
CO2 SERPL-SCNC: 21 MMOL/L — LOW (ref 22–31)
CREAT SERPL-MCNC: 3.12 MG/DL — HIGH (ref 0.5–1.3)
CULTURE RESULTS: NO GROWTH — SIGNIFICANT CHANGE UP
ELLIPTOCYTES BLD QL SMEAR: SLIGHT — SIGNIFICANT CHANGE UP
EOSINOPHIL # BLD AUTO: 0 K/UL — SIGNIFICANT CHANGE UP (ref 0–0.5)
EOSINOPHIL NFR BLD AUTO: 0 % — SIGNIFICANT CHANGE UP (ref 0–6)
GLUCOSE SERPL-MCNC: 84 MG/DL — SIGNIFICANT CHANGE UP (ref 70–99)
HCT VFR BLD CALC: 27.1 % — LOW (ref 39–50)
HGB BLD-MCNC: 7.9 G/DL — LOW (ref 13–17)
INR BLD: 2.26 RATIO — HIGH (ref 0.88–1.16)
LYMPHOCYTES # BLD AUTO: 1.34 K/UL — SIGNIFICANT CHANGE UP (ref 1–3.3)
LYMPHOCYTES # BLD AUTO: 5 % — LOW (ref 13–44)
M PNEUMO IGM SER-ACNC: 101 UNITS/ML — SIGNIFICANT CHANGE UP
MAGNESIUM SERPL-MCNC: 2.1 MG/DL — SIGNIFICANT CHANGE UP (ref 1.6–2.6)
MANUAL SMEAR VERIFICATION: SIGNIFICANT CHANGE UP
MCHC RBC-ENTMCNC: 22.5 PG — LOW (ref 27–34)
MCHC RBC-ENTMCNC: 29.2 GM/DL — LOW (ref 32–36)
MCV RBC AUTO: 77.2 FL — LOW (ref 80–100)
MICROCYTES BLD QL: SLIGHT — SIGNIFICANT CHANGE UP
MONOCYTES # BLD AUTO: 0.27 K/UL — SIGNIFICANT CHANGE UP (ref 0–0.9)
MONOCYTES NFR BLD AUTO: 1 % — LOW (ref 2–14)
MYCOPLASMA AG SPEC QL: NEGATIVE — SIGNIFICANT CHANGE UP
NEUTROPHILS # BLD AUTO: 25.17 K/UL — HIGH (ref 1.8–7.4)
NEUTROPHILS NFR BLD AUTO: 92 % — HIGH (ref 43–77)
NEUTS BAND # BLD: 2 % — SIGNIFICANT CHANGE UP (ref 0–8)
NRBC # BLD: 0 /100 — SIGNIFICANT CHANGE UP (ref 0–0)
NRBC # BLD: SIGNIFICANT CHANGE UP /100 WBCS (ref 0–0)
OVALOCYTES BLD QL SMEAR: SLIGHT — SIGNIFICANT CHANGE UP
PHOSPHATE SERPL-MCNC: 5.9 MG/DL — HIGH (ref 2.5–4.5)
PLAT MORPH BLD: NORMAL — SIGNIFICANT CHANGE UP
PLATELET # BLD AUTO: 274 K/UL — SIGNIFICANT CHANGE UP (ref 150–400)
POIKILOCYTOSIS BLD QL AUTO: SLIGHT — SIGNIFICANT CHANGE UP
POTASSIUM SERPL-MCNC: 5.4 MMOL/L — HIGH (ref 3.5–5.3)
POTASSIUM SERPL-SCNC: 5.4 MMOL/L — HIGH (ref 3.5–5.3)
PROTHROM AB SERPL-ACNC: 24.8 SEC — HIGH (ref 9.8–12.7)
RBC # BLD: 3.51 M/UL — LOW (ref 4.2–5.8)
RBC # FLD: 20.4 % — HIGH (ref 10.3–14.5)
RBC BLD AUTO: ABNORMAL
SODIUM SERPL-SCNC: 134 MMOL/L — LOW (ref 135–145)
SPECIMEN SOURCE: SIGNIFICANT CHANGE UP
T4 FREE SERPL-MCNC: 1.7 NG/DL — HIGH (ref 0.76–1.46)
TROPONIN I SERPL-MCNC: 0.02 NG/ML — SIGNIFICANT CHANGE UP (ref 0.01–0.04)
TSH SERPL-MCNC: 0.26 UU/ML — LOW (ref 0.34–4.82)
WBC # BLD: 26.78 K/UL — HIGH (ref 3.8–10.5)
WBC # FLD AUTO: 26.78 K/UL — HIGH (ref 3.8–10.5)

## 2018-09-11 PROCEDURE — 99223 1ST HOSP IP/OBS HIGH 75: CPT

## 2018-09-11 PROCEDURE — 93010 ELECTROCARDIOGRAM REPORT: CPT

## 2018-09-11 PROCEDURE — 93306 TTE W/DOPPLER COMPLETE: CPT | Mod: 26

## 2018-09-11 RX ORDER — INFLUENZA VIRUS VACCINE 15; 15; 15; 15 UG/.5ML; UG/.5ML; UG/.5ML; UG/.5ML
0.5 SUSPENSION INTRAMUSCULAR ONCE
Qty: 0 | Refills: 0 | Status: DISCONTINUED | OUTPATIENT
Start: 2018-09-11 | End: 2018-09-13

## 2018-09-11 RX ORDER — DORZOLAMIDE HYDROCHLORIDE 20 MG/ML
1 SOLUTION/ DROPS OPHTHALMIC THREE TIMES A DAY
Qty: 0 | Refills: 0 | Status: DISCONTINUED | OUTPATIENT
Start: 2018-09-11 | End: 2018-09-13

## 2018-09-11 RX ORDER — SENNA PLUS 8.6 MG/1
2 TABLET ORAL AT BEDTIME
Qty: 0 | Refills: 0 | Status: DISCONTINUED | OUTPATIENT
Start: 2018-09-11 | End: 2018-09-13

## 2018-09-11 RX ORDER — PIPERACILLIN AND TAZOBACTAM 4; .5 G/20ML; G/20ML
3.38 INJECTION, POWDER, LYOPHILIZED, FOR SOLUTION INTRAVENOUS EVERY 12 HOURS
Qty: 0 | Refills: 0 | Status: DISCONTINUED | OUTPATIENT
Start: 2018-09-11 | End: 2018-09-13

## 2018-09-11 RX ORDER — IPRATROPIUM/ALBUTEROL SULFATE 18-103MCG
3 AEROSOL WITH ADAPTER (GRAM) INHALATION EVERY 4 HOURS
Qty: 0 | Refills: 0 | Status: DISCONTINUED | OUTPATIENT
Start: 2018-09-11 | End: 2018-09-13

## 2018-09-11 RX ORDER — ACETAMINOPHEN 500 MG
650 TABLET ORAL EVERY 6 HOURS
Qty: 0 | Refills: 0 | Status: DISCONTINUED | OUTPATIENT
Start: 2018-09-11 | End: 2018-09-13

## 2018-09-11 RX ORDER — GABAPENTIN 400 MG/1
1 CAPSULE ORAL
Qty: 0 | Refills: 0 | COMMUNITY

## 2018-09-11 RX ORDER — ISOSORBIDE MONONITRATE 60 MG/1
60 TABLET, EXTENDED RELEASE ORAL DAILY
Qty: 0 | Refills: 0 | Status: DISCONTINUED | OUTPATIENT
Start: 2018-09-11 | End: 2018-09-11

## 2018-09-11 RX ORDER — AZITHROMYCIN 500 MG/1
TABLET, FILM COATED ORAL
Qty: 0 | Refills: 0 | Status: DISCONTINUED | OUTPATIENT
Start: 2018-09-11 | End: 2018-09-11

## 2018-09-11 RX ORDER — LEVOTHYROXINE SODIUM 125 MCG
200 TABLET ORAL DAILY
Qty: 0 | Refills: 0 | Status: DISCONTINUED | OUTPATIENT
Start: 2018-09-11 | End: 2018-09-12

## 2018-09-11 RX ORDER — ASPIRIN/CALCIUM CARB/MAGNESIUM 324 MG
81 TABLET ORAL DAILY
Qty: 0 | Refills: 0 | Status: DISCONTINUED | OUTPATIENT
Start: 2018-09-11 | End: 2018-09-13

## 2018-09-11 RX ORDER — WARFARIN SODIUM 2.5 MG/1
2.5 TABLET ORAL AT BEDTIME
Qty: 0 | Refills: 0 | Status: DISCONTINUED | OUTPATIENT
Start: 2018-09-11 | End: 2018-09-12

## 2018-09-11 RX ORDER — PANTOPRAZOLE SODIUM 20 MG/1
40 TABLET, DELAYED RELEASE ORAL
Qty: 0 | Refills: 0 | Status: DISCONTINUED | OUTPATIENT
Start: 2018-09-11 | End: 2018-09-13

## 2018-09-11 RX ORDER — DORZOLAMIDE HYDROCHLORIDE 20 MG/ML
1 SOLUTION/ DROPS OPHTHALMIC ONCE
Qty: 0 | Refills: 0 | Status: COMPLETED | OUTPATIENT
Start: 2018-09-11 | End: 2018-09-11

## 2018-09-11 RX ORDER — OXYCODONE HYDROCHLORIDE 5 MG/1
5 TABLET ORAL EVERY 4 HOURS
Qty: 0 | Refills: 0 | Status: DISCONTINUED | OUTPATIENT
Start: 2018-09-11 | End: 2018-09-13

## 2018-09-11 RX ORDER — FINASTERIDE 5 MG/1
5 TABLET, FILM COATED ORAL DAILY
Qty: 0 | Refills: 0 | Status: DISCONTINUED | OUTPATIENT
Start: 2018-09-11 | End: 2018-09-13

## 2018-09-11 RX ORDER — DORZOLAMIDE HYDROCHLORIDE 20 MG/ML
SOLUTION/ DROPS OPHTHALMIC
Qty: 0 | Refills: 0 | Status: DISCONTINUED | OUTPATIENT
Start: 2018-09-11 | End: 2018-09-13

## 2018-09-11 RX ORDER — SENNA PLUS 8.6 MG/1
2 TABLET ORAL
Qty: 0 | Refills: 0 | COMMUNITY

## 2018-09-11 RX ORDER — ACETAMINOPHEN 500 MG
2 TABLET ORAL
Qty: 0 | Refills: 0 | COMMUNITY

## 2018-09-11 RX ORDER — ALPRAZOLAM 0.25 MG
0.25 TABLET ORAL
Qty: 0 | Refills: 0 | COMMUNITY

## 2018-09-11 RX ORDER — CEFEPIME 1 G/1
2000 INJECTION, POWDER, FOR SOLUTION INTRAMUSCULAR; INTRAVENOUS EVERY 24 HOURS
Qty: 0 | Refills: 0 | Status: DISCONTINUED | OUTPATIENT
Start: 2018-09-11 | End: 2018-09-11

## 2018-09-11 RX ORDER — TAMSULOSIN HYDROCHLORIDE 0.4 MG/1
0.4 CAPSULE ORAL AT BEDTIME
Qty: 0 | Refills: 0 | Status: DISCONTINUED | OUTPATIENT
Start: 2018-09-11 | End: 2018-09-13

## 2018-09-11 RX ORDER — SODIUM CHLORIDE 9 MG/ML
500 INJECTION INTRAMUSCULAR; INTRAVENOUS; SUBCUTANEOUS ONCE
Qty: 0 | Refills: 0 | Status: COMPLETED | OUTPATIENT
Start: 2018-09-11 | End: 2018-09-11

## 2018-09-11 RX ORDER — LATANOPROST 0.05 MG/ML
1 SOLUTION/ DROPS OPHTHALMIC; TOPICAL AT BEDTIME
Qty: 0 | Refills: 0 | Status: DISCONTINUED | OUTPATIENT
Start: 2018-09-11 | End: 2018-09-13

## 2018-09-11 RX ORDER — SENNA PLUS 8.6 MG/1
1 TABLET ORAL
Qty: 0 | Refills: 0 | COMMUNITY

## 2018-09-11 RX ORDER — DOCUSATE SODIUM 100 MG
100 CAPSULE ORAL THREE TIMES A DAY
Qty: 0 | Refills: 0 | Status: DISCONTINUED | OUTPATIENT
Start: 2018-09-11 | End: 2018-09-13

## 2018-09-11 RX ORDER — LIDOCAINE 4 G/100G
1 CREAM TOPICAL DAILY
Qty: 0 | Refills: 0 | Status: DISCONTINUED | OUTPATIENT
Start: 2018-09-11 | End: 2018-09-13

## 2018-09-11 RX ORDER — ATORVASTATIN CALCIUM 80 MG/1
40 TABLET, FILM COATED ORAL AT BEDTIME
Qty: 0 | Refills: 0 | Status: DISCONTINUED | OUTPATIENT
Start: 2018-09-11 | End: 2018-09-13

## 2018-09-11 RX ORDER — IPRATROPIUM/ALBUTEROL SULFATE 18-103MCG
3 AEROSOL WITH ADAPTER (GRAM) INHALATION EVERY 8 HOURS
Qty: 0 | Refills: 0 | Status: COMPLETED | OUTPATIENT
Start: 2018-09-11 | End: 2018-09-12

## 2018-09-11 RX ADMIN — Medication 3 MILLILITER(S): at 07:46

## 2018-09-11 RX ADMIN — SODIUM CHLORIDE 500 MILLILITER(S): 9 INJECTION INTRAMUSCULAR; INTRAVENOUS; SUBCUTANEOUS at 04:40

## 2018-09-11 RX ADMIN — Medication 3 MILLILITER(S): at 23:36

## 2018-09-11 RX ADMIN — Medication 1 TABLET(S): at 17:08

## 2018-09-11 RX ADMIN — WARFARIN SODIUM 2.5 MILLIGRAM(S): 2.5 TABLET ORAL at 22:01

## 2018-09-11 RX ADMIN — DORZOLAMIDE HYDROCHLORIDE 1 DROP(S): 20 SOLUTION/ DROPS OPHTHALMIC at 22:01

## 2018-09-11 RX ADMIN — LATANOPROST 1 DROP(S): 0.05 SOLUTION/ DROPS OPHTHALMIC; TOPICAL at 22:00

## 2018-09-11 RX ADMIN — FINASTERIDE 5 MILLIGRAM(S): 5 TABLET, FILM COATED ORAL at 11:38

## 2018-09-11 RX ADMIN — PANTOPRAZOLE SODIUM 40 MILLIGRAM(S): 20 TABLET, DELAYED RELEASE ORAL at 06:36

## 2018-09-11 RX ADMIN — CEFEPIME 100 MILLIGRAM(S): 1 INJECTION, POWDER, FOR SOLUTION INTRAMUSCULAR; INTRAVENOUS at 06:56

## 2018-09-11 RX ADMIN — Medication 1 TABLET(S): at 11:38

## 2018-09-11 RX ADMIN — OXYCODONE HYDROCHLORIDE 5 MILLIGRAM(S): 5 TABLET ORAL at 21:54

## 2018-09-11 RX ADMIN — TAMSULOSIN HYDROCHLORIDE 0.4 MILLIGRAM(S): 0.4 CAPSULE ORAL at 22:01

## 2018-09-11 RX ADMIN — Medication 110 MILLIGRAM(S): at 17:07

## 2018-09-11 RX ADMIN — DORZOLAMIDE HYDROCHLORIDE 1 DROP(S): 20 SOLUTION/ DROPS OPHTHALMIC at 13:05

## 2018-09-11 RX ADMIN — SENNA PLUS 2 TABLET(S): 8.6 TABLET ORAL at 22:01

## 2018-09-11 RX ADMIN — Medication 3 MILLILITER(S): at 18:34

## 2018-09-11 RX ADMIN — Medication 1 TABLET(S): at 06:56

## 2018-09-11 RX ADMIN — Medication 81 MILLIGRAM(S): at 11:38

## 2018-09-11 RX ADMIN — OXYCODONE HYDROCHLORIDE 5 MILLIGRAM(S): 5 TABLET ORAL at 11:38

## 2018-09-11 RX ADMIN — Medication 100 MILLIGRAM(S): at 06:36

## 2018-09-11 RX ADMIN — OXYCODONE HYDROCHLORIDE 5 MILLIGRAM(S): 5 TABLET ORAL at 06:55

## 2018-09-11 RX ADMIN — ATORVASTATIN CALCIUM 40 MILLIGRAM(S): 80 TABLET, FILM COATED ORAL at 22:01

## 2018-09-11 RX ADMIN — Medication 100 MILLIGRAM(S): at 22:01

## 2018-09-11 RX ADMIN — Medication 110 MILLIGRAM(S): at 13:05

## 2018-09-11 RX ADMIN — Medication 100 MILLIGRAM(S): at 13:05

## 2018-09-11 RX ADMIN — LIDOCAINE 1 PATCH: 4 CREAM TOPICAL at 11:38

## 2018-09-11 RX ADMIN — Medication 3 MILLILITER(S): at 14:34

## 2018-09-11 RX ADMIN — PIPERACILLIN AND TAZOBACTAM 25 GRAM(S): 4; .5 INJECTION, POWDER, LYOPHILIZED, FOR SOLUTION INTRAVENOUS at 17:08

## 2018-09-11 RX ADMIN — OXYCODONE HYDROCHLORIDE 5 MILLIGRAM(S): 5 TABLET ORAL at 15:23

## 2018-09-11 RX ADMIN — Medication 200 MICROGRAM(S): at 06:56

## 2018-09-11 NOTE — H&P ADULT - PROBLEM SELECTOR PLAN 1
Sepsis 2/2 pneumonia, health care associated/ suspect gram negative ed  -Admit to med surg with continuous pulse oximetry  -Monitor vitals   -S/p vancomycin in ED. Continue per ID  -Cefepime 2g q24H  -F/U blood and urine cultures  -Check urine legionella/strep antigen.  -Check mycoplasma IgM  -ID consult  -Patient has molst form with DNR/DNI  -S/p 2500 ML NS bolus in the ED. Would hold on further fluids for now Sepsis 2/2 pneumonia, health care associated/ suspect gram negative ed  bilateral lower lobes  -hypothermia to 94.7  -Admit to telemetry with continuous pulse oximetry  -Monitor vitals   -S/p vancomycin in ED. Continue per ID  -Cefepime 2g q24H  -F/U blood and urine cultures  -Check urine legionella/strep antigen.  -Check mycoplasma IgM  -ID consult  -Patient has molst form with DNR/DNI  -S/p 2500 ML NS bolus in the ED. Would hold on further fluids for now  -trend temp

## 2018-09-11 NOTE — H&P ADULT - ATTENDING COMMENTS
Pt hx, exam and data reviewed w Dr. Shepard then pt briefly interviewed (son olinda) and examined w Dr. shepard at bedside    Pt presented from rehab.  Hx, ROS, exam and A/P as edited and outlined above Pt hx, exam and data reviewed w Dr. Shepard then pt briefly interviewed (son olinda) and examined w Dr. shepard at bedside    Pt presented from rehab.  Hx, ROS, exam and A/P as edited and outlined above    #Hypotension  patient mentating when awakened from sleep, resting comfortably  BP remained low 80-90/40s w HR 70-80 on manual BP determination- will give 500 cc bolus.  Bladder scan >400 w wet diaper  loss of fluids through skin as well    #Advanced directives  Accompanied by MOLST: DNR/DNI

## 2018-09-11 NOTE — H&P ADULT - PROBLEM SELECTOR PLAN 9
-Hold amlodipine 10mg QD given current hypotension  -Continue IMDUR 60mg QD with hold parameters (hold SBP <110)  -Consider restarting furosemide if kidney function improves in AM as above

## 2018-09-11 NOTE — H&P ADULT - PROBLEM SELECTOR PLAN 10
-Patient is therapeutically anticoagulated on coumadin.  IMPROVE VTE Individual Risk Assessment    RISK                                                                Points  [  ] Previous VTE                                                  3  [  ] Thrombophilia                                               2  [  ] Lower limb paralysis                                      2        (unable to hold up >15 seconds)    [  ] Current Cancer                                              2         (within 6 months)  [ x ] Immobilization > 24 hrs                                1  [  ] ICU/CCU stay > 24 hours                              1  [ x ] Age > 60                                                      1  IMPROVE VTE Score _____2___

## 2018-09-11 NOTE — PROGRESS NOTE ADULT - SUBJECTIVE AND OBJECTIVE BOX
pt seen and examined. case d/w son at bedside  Vital Signs Last 24 Hrs  T(C): 36.1 (11 Sep 2018 10:), Max: 36.5 (10 Sep 2018 21:35)  T(F): 97 (11 Sep 2018 10:), Max: 97.7 (10 Sep 2018 21:35)  HR: 83 (11 Sep 2018 10:) (64 - 83)  BP: 95/68 (11 Sep 2018 10:) (86/43 - 158/85)  BP(mean): --  RR: 20 (11 Sep 2018 10:27) (18 - 25)  SpO2: 98% (11 Sep 2018 10:) (92% - 100%)    pulm - scattered rhonchi b/l                              7.9    26.78 )-----------( 274      ( 11 Sep 2018 06:33 )             27.1     09-11    134<L>  |  103  |  82<H>  ----------------------------<  84  5.4<H>   |  21<L>  |  3.12<H>    Ca    8.1<L>      11 Sep 2018 06:33  Phos  5.9     09-11  Mg     2.1     09-11    TPro  6.1  /  Alb  2.0<L>  /  TBili  0.4  /  DBili  x   /  AST  30  /  ALT  25  /  AlkPhos  111  09-10    CARDIAC MARKERS ( 11 Sep 2018 06:33 )  0.016 ng/mL / x     / x     / x     / x      CARDIAC MARKERS ( 10 Sep 2018 16:17 )  <0.015 ng/mL / x     / x     / x     / x          LIVER FUNCTIONS - ( 10 Sep 2018 16:17 )  Alb: 2.0 g/dL / Pro: 6.1 gm/dL / ALK PHOS: 111 U/L / ALT: 25 U/L / AST: 30 U/L / GGT: x           PT/INR - ( 11 Sep 2018 06:33 )   PT: 24.8 sec;   INR: 2.26 ratio         PTT - ( 10 Sep 2018 16:17 )  PTT:47.0 sec  Urinalysis Basic - ( 10 Sep 2018 16:17 )    Color: Yellow / Appearance: Clear / S.015 / pH: x  Gluc: x / Ketone: Negative  / Bili: Small / Urobili: Negative mg/dL   Blood: x / Protein: Negative mg/dL / Nitrite: Negative   Leuk Esterase: Trace / RBC: 6-10 /HPF / WBC 6-10   Sq Epi: x / Non Sq Epi: Occasional / Bacteria: Occasional        Lactate, Blood: 1.9 mmol/L (09-10 @ 16:17)      sepsis 2/2 HCAP suspected gram neg ed pna - ID eval noted. continue zosyn, doxy, supportive care  - cardio input noted and will f/u ECHO

## 2018-09-11 NOTE — CONSULT NOTE ADULT - SUBJECTIVE AND OBJECTIVE BOX
Patient is a 89y old  Male who presents with a chief complaint of cough (11 Sep 2018 09:16)    HPI:  89 y/o Male with h/o A.fib on coumadin, COPD on intermittent home O2, HTN, HLD, CKD stage 3, lumbar compression fractures s/p fall (2018) was admitted on  for cough and SOB. His symptoms have been gradually progressive over the past 2-3 days PTA. Cough is productive for sputum. No associated fever or chills, but has fatigue, anorexia and is reported more confused. He was noted to have leukocytosis and kidney dysfunction at [facility] and was sent to the ED for evaluation. In the ED, patient received cefepime, vancomycin IV x 1.    PMH: as above  PSH: as above  Meds: per reconciliation sheet, noted below  MEDICATIONS  (STANDING):  ALBUTerol/ipratropium for Nebulization 3 milliLiter(s) Nebulizer every 8 hours  aspirin enteric coated 81 milliGRAM(s) Oral daily  atorvastatin 40 milliGRAM(s) Oral at bedtime  calcium carbonate 1250 mG  + Vitamin D (OsCal 500 + D) 1 Tablet(s) Oral two times a day  cefepime   IVPB 2000 milliGRAM(s) IV Intermittent every 24 hours  docusate sodium 100 milliGRAM(s) Oral three times a day  dorzolamide 2% Ophthalmic Solution 1 Drop(s) Both EYES three times a day  dorzolamide 2% Ophthalmic Solution      finasteride 5 milliGRAM(s) Oral daily  influenza   Vaccine 0.5 milliLiter(s) IntraMuscular once  isosorbide   mononitrate ER Tablet (IMDUR) 60 milliGRAM(s) Oral daily  latanoprost 0.005% Ophthalmic Solution 1 Drop(s) Both EYES at bedtime  levothyroxine 200 MICROGram(s) Oral daily  lidocaine   Patch 1 Patch Transdermal daily  multivitamin 1 Tablet(s) Oral daily  pantoprazole    Tablet 40 milliGRAM(s) Oral before breakfast  senna 2 Tablet(s) Oral at bedtime  tamsulosin 0.4 milliGRAM(s) Oral at bedtime  warfarin 2.5 milliGRAM(s) Oral at bedtime    MEDICATIONS  (PRN):  acetaminophen   Tablet .. 650 milliGRAM(s) Oral every 6 hours PRN Temp greater or equal to 38C (100.4F), Mild Pain (1 - 3)  ALBUTerol/ipratropium for Nebulization 3 milliLiter(s) Nebulizer every 4 hours PRN Shortness of Breath and/or Wheezing  oxyCODONE    IR 5 milliGRAM(s) Oral every 4 hours PRN Moderate Pain (4 - 6)    Allergies    No Known Allergies    Intolerances      Social: no smoking, no alcohol, no illegal drugs; no recent travel, no exposure to TB  FAMILY HISTORY:  No pertinent family history in first degree relatives    no history of premature cardiovascular disease in first degree relatives    ROS: the patient denies fever, no chills, no HA, no seizures, no dizziness, no sore throat, no nasal congestion, no blurry vision, no CP, no palpitations, no SOB, no cough, no abdominal pain, no diarrhea, no N/V, no dysuria, no leg pain, no claudication, no rash, no joint aches, no rectal pain or bleeding, no night sweats  All other systems reviewed and are negative    Vital Signs Last 24 Hrs  T(C): 36.1 (11 Sep 2018 10:27), Max: 36.5 (10 Sep 2018 21:35)  T(F): 97 (11 Sep 2018 10:), Max: 97.7 (10 Sep 2018 21:35)  HR: 83 (11 Sep 2018 10:) (64 - 83)  BP: 95/68 (11 Sep 2018 10:) (86/43 - 158/85)  BP(mean): --  RR: 20 (11 Sep 2018 10:27) (18 - 25)  SpO2: 98% (11 Sep 2018 10:) (92% - 100%)  Daily Height in cm: 193.04 (10 Sep 2018 15:40)    Daily Weight in k (11 Sep 2018 09:03)    PE:    Constitutional: frail looking  HEENT: NC/AT, EOMI, PERRLA, conjunctivae clear; ears and nose atraumatic; pharynx benign  Neck: supple; thyroid not palpable  Back: no tenderness  Respiratory: respiratory effort normal; clear to auscultation  Cardiovascular: S1S2 regular, no murmurs  Abdomen: soft, not tender, not distended, positive BS; no liver or spleen organomegaly  Genitourinary: no suprapubic tenderness  Lymphatic: no LN palpable  Musculoskeletal: no muscle tenderness, no joint swelling or tenderness  Extremities: no pedal edema  Neurological/ Psychiatric: AxOx3, judgement and insight normal; moving all extremities  Skin: no rashes; no palpable lesions    Labs: all available labs reviewed                        7.9    26.78 )-----------( 274      ( 11 Sep 2018 06:33 )             27.1     -    134<L>  |  103  |  82<H>  ----------------------------<  84  5.4<H>   |  21<L>  |  3.12<H>    Ca    8.1<L>      11 Sep 2018 06:33  Phos  5.9       Mg     2.1         TPro  6.1  /  Alb  2.0<L>  /  TBili  0.4  /  DBili  x   /  AST  30  /  ALT  25  /  AlkPhos  111  09-10     LIVER FUNCTIONS - ( 10 Sep 2018 16:17 )  Alb: 2.0 g/dL / Pro: 6.1 gm/dL / ALK PHOS: 111 U/L / ALT: 25 U/L / AST: 30 U/L / GGT: x           Urinalysis Basic - ( 10 Sep 2018 16:17 )    Color: Yellow / Appearance: Clear / S.015 / pH: x  Gluc: x / Ketone: Negative  / Bili: Small / Urobili: Negative mg/dL   Blood: x / Protein: Negative mg/dL / Nitrite: Negative   Leuk Esterase: Trace / RBC: 6-10 /HPF / WBC 6-10   Sq Epi: x / Non Sq Epi: Occasional / Bacteria: Occasional          Radiology: all available radiological tests reviewed    < from: CT Abdomen and Pelvis No Cont (09.10.18 @ 19:20) >  Extensive bibasilar pneumonia. Right pleural effusion.  Distended gallbladder with cholelithiasis. If warranted correlate with   ultrasound and/or HIDA scan  Markedly enlarged prostate.    < end of copied text >      Advanced directives addressed: full resuscitation Patient is a 89y old  Male who presents with a chief complaint of cough (11 Sep 2018 09:16)    HPI:  89 y/o Male with h/o A.fib on coumadin, COPD on intermittent home O2, HTN, HLD, CKD stage 3, lumbar compression fractures s/p fall (2018) was admitted on  for cough and SOB. His symptoms have been gradually progressive over the past 2-3 days PTA. Cough is productive for sputum. No associated fever or chills, but has fatigue, anorexia and is reported more confused. He was noted to have leukocytosis and kidney dysfunction at [facility] and was sent to the ED for evaluation. In the ED, patient received cefepime, vancomycin IV x 1.    PMH: as above  PSH: as above  Meds: per reconciliation sheet, noted below  MEDICATIONS  (STANDING):  ALBUTerol/ipratropium for Nebulization 3 milliLiter(s) Nebulizer every 8 hours  aspirin enteric coated 81 milliGRAM(s) Oral daily  atorvastatin 40 milliGRAM(s) Oral at bedtime  calcium carbonate 1250 mG  + Vitamin D (OsCal 500 + D) 1 Tablet(s) Oral two times a day  cefepime   IVPB 2000 milliGRAM(s) IV Intermittent every 24 hours  docusate sodium 100 milliGRAM(s) Oral three times a day  dorzolamide 2% Ophthalmic Solution 1 Drop(s) Both EYES three times a day  dorzolamide 2% Ophthalmic Solution      finasteride 5 milliGRAM(s) Oral daily  influenza   Vaccine 0.5 milliLiter(s) IntraMuscular once  isosorbide   mononitrate ER Tablet (IMDUR) 60 milliGRAM(s) Oral daily  latanoprost 0.005% Ophthalmic Solution 1 Drop(s) Both EYES at bedtime  levothyroxine 200 MICROGram(s) Oral daily  lidocaine   Patch 1 Patch Transdermal daily  multivitamin 1 Tablet(s) Oral daily  pantoprazole    Tablet 40 milliGRAM(s) Oral before breakfast  senna 2 Tablet(s) Oral at bedtime  tamsulosin 0.4 milliGRAM(s) Oral at bedtime  warfarin 2.5 milliGRAM(s) Oral at bedtime    MEDICATIONS  (PRN):  acetaminophen   Tablet .. 650 milliGRAM(s) Oral every 6 hours PRN Temp greater or equal to 38C (100.4F), Mild Pain (1 - 3)  ALBUTerol/ipratropium for Nebulization 3 milliLiter(s) Nebulizer every 4 hours PRN Shortness of Breath and/or Wheezing  oxyCODONE    IR 5 milliGRAM(s) Oral every 4 hours PRN Moderate Pain (4 - 6)    Allergies    No Known Allergies    Intolerances      Social: no smoking, no alcohol, no illegal drugs; no recent travel, no exposure to TB  FAMILY HISTORY:  No pertinent family history in first degree relatives    no history of premature cardiovascular disease in first degree relatives    ROS limited due to mild confusion: the patient denies fever, no chills, no HA, no seizures, no dizziness, no sore throat, no nasal congestion, no CP, no palpitations, has SOB, has cough, no abdominal pain, no diarrhea, no N/V, no dysuria, no leg pain, no joint aches, no rectal pain or bleeding, no night sweats  All other systems reviewed and are negative    Vital Signs Last 24 Hrs  T(C): 36.1 (11 Sep 2018 10:27), Max: 36.5 (10 Sep 2018 21:35)  T(F): 97 (11 Sep 2018 10:), Max: 97.7 (10 Sep 2018 21:35)  HR: 83 (11 Sep 2018 10:) (64 - 83)  BP: 95/68 (11 Sep 2018 10:) (86/43 - 158/85)  BP(mean): --  RR: 20 (11 Sep 2018 10:27) (18 - 25)  SpO2: 98% (11 Sep 2018 10:) (92% - 100%)  Daily Height in cm: 193.04 (10 Sep 2018 15:40)    Daily Weight in k (11 Sep 2018 09:03)    PE:    Constitutional: frail looking  HEENT: NC/AT, EOMI, PERRLA, conjunctivae clear; ears and nose atraumatic; pharynx benign  Neck: supple; thyroid not palpable  Back: no tenderness  Respiratory: respiratory effort normal; clear to auscultation  Cardiovascular: S1S2 regular, no murmurs  Abdomen: soft, not tender, not distended, positive BS; no liver or spleen organomegaly  Genitourinary: no suprapubic tenderness  Lymphatic: no LN palpable  Musculoskeletal: no muscle tenderness, no joint swelling or tenderness  Extremities: 3+ pedal edema; ankle erythema and scant serous discharge  Neurological/ Psychiatric: AxOx3, judgement and insight normal; moving all extremities  Skin: no rashes; no palpable lesions    Labs: all available labs reviewed                        7.9    26.78 )-----------( 274      ( 11 Sep 2018 06:33 )             27.1     -    134<L>  |  103  |  82<H>  ----------------------------<  84  5.4<H>   |  21<L>  |  3.12<H>    Ca    8.1<L>      11 Sep 2018 06:33  Phos  5.9       Mg     2.1         TPro  6.1  /  Alb  2.0<L>  /  TBili  0.4  /  DBili  x   /  AST  30  /  ALT  25  /  AlkPhos  111  09-10     LIVER FUNCTIONS - ( 10 Sep 2018 16:17 )  Alb: 2.0 g/dL / Pro: 6.1 gm/dL / ALK PHOS: 111 U/L / ALT: 25 U/L / AST: 30 U/L / GGT: x           Urinalysis Basic - ( 10 Sep 2018 16:17 )    Color: Yellow / Appearance: Clear / S.015 / pH: x  Gluc: x / Ketone: Negative  / Bili: Small / Urobili: Negative mg/dL   Blood: x / Protein: Negative mg/dL / Nitrite: Negative   Leuk Esterase: Trace / RBC: 6-10 /HPF / WBC 6-10   Sq Epi: x / Non Sq Epi: Occasional / Bacteria: Occasional          Radiology: all available radiological tests reviewed    < from: CT Abdomen and Pelvis No Cont (09.10.18 @ 19:20) >  Extensive bibasilar pneumonia. Right pleural effusion.  Distended gallbladder with cholelithiasis. If warranted correlate with   ultrasound and/or HIDA scan  Markedly enlarged prostate.    < end of copied text >      Advanced directives addressed: full resuscitation

## 2018-09-11 NOTE — ADVANCED PRACTICE NURSE CONSULT - RECOMMEDATIONS
1) Turn and position every 2 hours  2) Elevate heels off mattress  3) Cleansed lower extremities with soap and water daily.   4) Moisturize lower extremities when maceration resolves

## 2018-09-11 NOTE — H&P ADULT - NSHPPHYSICALEXAM_GEN_ALL_CORE
Vital Signs Last 24 Hrs  T(C): 36.5 (10 Sep 2018 21:35), Max: 36.5 (10 Sep 2018 21:35)  T(F): 97.7 (10 Sep 2018 21:35), Max: 97.7 (10 Sep 2018 21:35)  HR: 74 (11 Sep 2018 01:32) (64 - 74)  BP: 95/30 (11 Sep 2018 01:35) (91/55 - 117/74)  BP(mean): --  RR: 22 (11 Sep 2018 01:32) (18 - 22)  SpO2: 100% (11 Sep 2018 01:32) (95% - 100%)    PHYSICAL EXAM:    GENERAL: NAD, frail  HEAD:  NC/AT  EYES: EOMI, no scleral icterus  HEENT: Moist mucous membranes  NECK: Supple, No JVD  CNS:  Alert & Oriented X3  LUNG: Mild bibasilar crackles; No rhonchi, wheezing, or rubs  HEART: Irregular rhythm, +murmur, no rubs, or gallops  ABDOMEN: +BS, ST/ND/NT  GENITOURINARY- Voiding, Bladder not distended  EXTREMITIES:  1+ Peripheral Pulses, 2+ pitting edema to the knee.   MUSCULOSKELTAL- Joints normal ROM, no Muscle or joint tenderness  SKIN: Bilateral lower extremity chronic venous stasis. Vital Signs Last 24 Hrs  T(C): 36.5 (10 Sep 2018 21:35), Max: 36.5 (10 Sep 2018 21:35)  T(F): 97.7 (10 Sep 2018 21:35), Max: 97.7 (10 Sep 2018 21:35)  HR: 74 (11 Sep 2018 01:32) (64 - 74)  BP: 95/30 (11 Sep 2018 01:35) (91/55 - 117/74)  BP(mean): --  RR: 22 (11 Sep 2018 01:32) (18 - 22)  SpO2: 100% (11 Sep 2018 01:32) (95% - 100%)    PHYSICAL EXAM:    GENERAL: NAD, frail  HEAD:  NC/AT  EYES: EOMI, no scleral icterus  HEENT: Moist mucous membranes  NECK: Supple, No JVD  CNS:  Alert & Oriented X3  LUNG: Mild bibasilar crackles; No rhonchi, wheezing, or rubs  + transmitted upper airway sounds  HEART: Irregular rhythm, +murmur, no rubs, or gallops  ABDOMEN: +BS, ST/ND/NT  GENITOURINARY- Voiding, Bladder not distended  EXTREMITIES:  1+ Peripheral Pulses, 2+ pitting edema to the knee.   MUSCULOSKELTAL- Joints normal ROM, no Muscle or joint tenderness  SKIN: Bilateral lower extremity chronic venous stasis  + increased warmth and mild erythema to both lower extremities w weeping onto sheets and pillows

## 2018-09-11 NOTE — H&P ADULT - NSHPREVIEWOFSYSTEMS_GEN_ALL_CORE
REVIEW OF SYSTEMS:    CONSTITUTIONAL: + weakness, no fevers or chills  EYES/ENT: No visual changes;  No vertigo or throat pain   NECK: No pain or stiffness  RESPIRATORY: + cough, no wheezing, hemoptysis; + shortness of breath  CARDIOVASCULAR: No chest pain or palpitations  GASTROINTESTINAL: No abdominal or epigastric pain. No nausea, vomiting, or hematemesis; No diarrhea or constipation. No melena or hematochezia.  GENITOURINARY: No dysuria, frequency or hematuria  NEUROLOGICAL: No numbness or weakness  SKIN: No itching, burning, rashes, or lesions   All other review of systems is negative unless indicated above. REVIEW OF SYSTEMS:    CONSTITUTIONAL: + weakness, no fevers or chills  EYES/ENT: No visual changes;  No vertigo or throat pain   NECK: No pain or stiffness  RESPIRATORY: + cough, no wheezing, hemoptysis; + shortness of breath  CARDIOVASCULAR: No chest pain or palpitations  GASTROINTESTINAL: No abdominal or epigastric pain. No nausea, vomiting, or hematemesis; No diarrhea or constipation. No melena or hematochezia.  GENITOURINARY: No dysuria, frequency or hematuria  NEUROLOGICAL: No numbness or weakness  SKIN: No itching, burning    + has weeping swollen legs bilaterally and skin tears at rehab  All other review of systems is negative unless indicated above.

## 2018-09-11 NOTE — CONSULT NOTE ADULT - SUBJECTIVE AND OBJECTIVE BOX
CHIEF COMPLAINT: Patient is a 89y old  Male who presents with a chief complaint of cough     HPI:  89 y/o man with a history of  COPD (uses continuous supplemental oxygen), chronic atrial fibrillation, valvular heart disease (MR, TR, PI, AI), hypertension, chronic lower extremity edema, recent traumatic rib / lumbar fractures s/p fall (late July 2018), who presented to the ER on 9/10/18 for the evaluation of urinary frequency, leukocytosis, cough, and dyspnea.  Symptoms started several days prior to admission; unable to identify exacerbating or alleviating factors; no associated angina or orthopnea.    Cough is productive of sputum and associated with shortness of breath.  He was diagnosed with extensive bibasilar pneumonia.    PAST MEDICAL & SURGICAL HISTORY:  Chronic back pain  COPD (chronic obstructive pulmonary disease)  Kidney disease: since 1 year  Hypothyroidism  Occult GI bleeding  Atrial fibrillation  Diastolic heart failure  Hypertension  History of cataract extraction, unspecified laterality    SOCIAL HISTORY:   Alcohol: Denied  Smoking: Nonsmoker    FAMILY HISTORY: No pertinent family history in first degree relatives    MEDICATIONS  (STANDING):  ALBUTerol/ipratropium for Nebulization 3 milliLiter(s) Nebulizer every 8 hours  aspirin enteric coated 81 milliGRAM(s) Oral daily  atorvastatin 40 milliGRAM(s) Oral at bedtime  calcium carbonate 1250 mG  + Vitamin D (OsCal 500 + D) 1 Tablet(s) Oral two times a day  cefepime   IVPB 2000 milliGRAM(s) IV Intermittent every 24 hours  docusate sodium 100 milliGRAM(s) Oral three times a day  dorzolamide 2% Ophthalmic Solution 1 Drop(s) Both EYES three times a day  finasteride 5 milliGRAM(s) Oral daily  influenza   Vaccine 0.5 milliLiter(s) IntraMuscular once  isosorbide   mononitrate ER Tablet (IMDUR) 60 milliGRAM(s) Oral daily  latanoprost 0.005% Ophthalmic Solution 1 Drop(s) Both EYES at bedtime  levothyroxine 200 MICROGram(s) Oral daily  lidocaine   Patch 1 Patch Transdermal daily  multivitamin 1 Tablet(s) Oral daily  pantoprazole    Tablet 40 milliGRAM(s) Oral before breakfast  senna 2 Tablet(s) Oral at bedtime  tamsulosin 0.4 milliGRAM(s) Oral at bedtime  warfarin 2.5 milliGRAM(s) Oral at bedtime    Allergies:  No Known Allergies    REVIEW OF SYSTEMS:  CONSTITUTIONAL: + generalized weakness, no fever  Eyes: No visual changes  NECK: No pain or stiffness  RESPIRATORY: + cough, + shortness of breath  CARDIOVASCULAR: No chest pain or palpitations  GASTROINTESTINAL: No abdominal pain. No nausea, vomiting, or hematemesis; No diarrhea or constipation. No melena or hematochezia.  GENITOURINARY: +  frequency  NEUROLOGICAL: No numbness.  SKIN: No itching or rash  All other review of systems is negative unless indicated above    VITAL SIGNS:   Vital Signs Last 24 Hrs  T(C): 36.3 (11 Sep 2018 06:00), Max: 36.5 (10 Sep 2018 21:35)  T(F): 97.4 (11 Sep 2018 06:00), Max: 97.7 (10 Sep 2018 21:35)  HR: 70 (11 Sep 2018 06:50) (64 - 77)  BP: 100/60 (11 Sep 2018 06:50) (86/43 - 158/85)  RR: 20 (11 Sep 2018 06:50) (18 - 25)  SpO2: 95% (11 Sep 2018 06:50) (92% - 100%)    PHYSICAL EXAM:  Constitutional: Appears chronically-ill, no distress, eating small amount of breakfast  Pulmonary: Non-labored, supplemental O2 via N/C; scattered rhonchi, frequent cough  Cardiovascular: S1 and S2, regular  Gastrointestinal: Bowel Sounds present, soft, nontender.   Lymph: + pedal edema. No cervical lymphadenopathy.  Neurological: Alert, no focal deficits  Skin: Warm, dry  Psych:  Mood & affect appropriate    LABS:                     7.9    26.78 )-----------( 274      ( 11 Sep 2018 06:33 )             27.1              134    |  103    |  82     ----------------------------<  84     5.4     |  21     |  3.12     PT/INR - ( 11 Sep 2018 06:33 )   PT: 24.8 sec;   INR: 2.26 ratio    PTT - ( 10 Sep 2018 16:17 )  PTT:47.0 sec    CARDIAC MARKERS ( 11 Sep 2018 06:33 ) 0.016 ng/mL / x     / x     / x     / x      CARDIAC MARKERS ( 10 Sep 2018 16:17 ) <0.015 ng/mL / x     / x     / x     / x        TSH: 0.26    Tele: AF    CT Abdomen and Pelvis No Cont (09.10.18 @ 19:20): Extensive bibasilar pneumonia. Right pleural effusion.Distended gallbladder with cholelithiasis. If warranted correlate with ultrasound and/or HIDA scan.  Markedly enlarged prostate.

## 2018-09-11 NOTE — CONSULT NOTE ADULT - PROBLEM SELECTOR RECOMMENDATION 4
Last echo was several years ago and demonstrated moderate MR/TR/PI; mild to moderate AI; recommend echo to evaluate for interval change.

## 2018-09-11 NOTE — H&P ADULT - PROBLEM SELECTOR PLAN 6
-INR currently therapeutic -INR currently therapeutic, check daily INR  -continue coumadin 2.5 mg daily for 2 days with 5 mg on every 3rd day.

## 2018-09-11 NOTE — H&P ADULT - HISTORY OF PRESENT ILLNESS
This is a 89 y/o M, PMHx significant for afib on coumadin, COPD on intermittent home O2, HTN, HLD, CKD stage 3, hospitalization 08/2018 for fall with lumbar compression fractures presents with cough and SOB. Gradually progressive symptoms over the past 2-3 days. Cough productive for sputum. No associated fever or chills. Associated with fatigue, anorexia. Patient's son reports he has been confused (normally AOx3 without cognitive deficits). Recently with increased leg swelling. Intermittently needs O2 at home at baseline.     Was noted to have leukocytosis and kidney dysfunction at [facility] rehab facility and was sent to the ED for evaluation.   Unable to obtain family history due to patient's clinical condition.   Since previous fall, unable to ambulate. One person assist to toilet.   In the ED, patient received cefepime, vancomycin and 2500 mL. This is a 89 y/o M, PMHx significant for afib on coumadin, COPD on intermittent home O2, HTN, HLD, CKD stage 3, hospitalization 08/2018 for fall with lumbar compression fractures presents with cough and SOB. Gradually progressive symptoms over the past 2-3 days. Cough productive for sputum. No associated fever or chills. Associated with fatigue, anorexia. Patient's son reports he has been confused (normally AOx3 without cognitive deficits). Recently with increased leg swelling. Intermittently needs O2 at home at baseline.     Was noted to have leukocytosis and kidney dysfunction at [facility] rehab facility and was sent to the ED for evaluation.   Unable to obtain family history due to patient's clinical condition.   Since previous fall, unable to ambulate. One person assist to toilet.   In the ED, patient received cefepime, vancomycin and 2500 mL. Initially temp 94.7

## 2018-09-11 NOTE — H&P ADULT - PROBLEM SELECTOR PLAN 8
-Wound care consult -weeping wound from both lower extremities  + poss early/mild cellulitis w some areas of erythema  - son indicated improved  1. loose kerlex tonight to contain weeping w padding  2. wound care consult    Wound care consult

## 2018-09-11 NOTE — H&P ADULT - PROBLEM SELECTOR PLAN 3
-Likely prerenal ACLLIE on CKD stage 3  -Check AM BMP s/p fluid bolus  -Avoid NSAIDS  -CONSIDER RESTARTING FUROSEMIDE (takes 60 mg QD) PENDING AM BMP -Likely prerenal CALLIE on CKD stage 3  losing fluid through weeping  -Check AM BMP s/p fluid bolus  -Avoid NSAIDS  -CONSIDER RESTARTING FUROSEMIDE (takes 60 mg QD) PENDING AM BMP

## 2018-09-11 NOTE — CONSULT NOTE ADULT - ASSESSMENT
89 y/o Male with h/o A.fib on coumadin, COPD on intermittent home O2, HTN, HLD, CKD stage 3, lumbar compression fractures s/p fall (08/2018) was admitted on 9/11 for cough and SOB. His symptoms have been gradually progressive over the past 2-3 days PTA. Cough is productive for sputum. No associated fever or chills, but has fatigue, anorexia and is reported more confused. He was noted to have leukocytosis and kidney dysfunction at [facility] and was sent to the ED for evaluation. In the ED, patient received cefepime, vancomycin IV x 1.    1. Acute respiratory failure. B/l pneumonia. Cholelithiasis. Possible acute cholecystis. BPH. CRF stage 4.  -leukocytosis  -obtain BC x 2, urine c/s  -start zosyn 3.375 gm IV q12h  -reason for abx use and side effects reviewed with patient; monitor BMP   -consider HIDA scan  -old chart reviewed to assess prior cultures  -monitor temps  -f/u CBC  -supportive care  2. Other issues:   -care per medicine 89 y/o Male with h/o A.fib on coumadin, COPD on intermittent home O2, HTN, HLD, CKD stage 3, lumbar compression fractures s/p fall (08/2018) was admitted on 9/11 for cough and SOB. His symptoms have been gradually progressive over the past 2-3 days PTA. Cough is productive for sputum. No associated fever or chills, but has fatigue, anorexia and is reported more confused. He was noted to have leukocytosis and kidney dysfunction at [facility] facility and was sent to the ED for evaluation. In the ED, patient received cefepime, vancomycin IV x 1.    1. Acute respiratory failure. B/l pneumonia. COPD exacerbation. Cholelithiasis. Possible acute cholecystis. BPH. ARF on CRF stage 4. Encephalopathy.  -leukocytosis  -pedal edema  -obtain BC x 2, urine c/s  -start zosyn 3.375 gm IV q12h and doxycycline 100 mg IV q12h  -reason for abx use and side effects reviewed with patient; monitor BMP   -consider HIDA scan  -old chart reviewed to assess prior cultures  -elevate legs  -monitor temps  -f/u CBC  -supportive care  2. Other issues:   -care per medicine

## 2018-09-11 NOTE — H&P ADULT - PROBLEM SELECTOR PLAN 2
-O2 supplementation PRN  -Duonebs q4H PRN for SOB/wheezing. q8H standing  -Hold off on steroids for now as no audible wheezes -O2 supplementation   -Duonebs q4H PRN for SOB/wheezing. q8H standing  -Hold off on steroids for now as no audible wheezes

## 2018-09-11 NOTE — H&P ADULT - PROBLEM SELECTOR PLAN 4
-Continue oxycodone 5mg q4H PRN  -Hold oxycodone 10mg q4H PRN  -Hold gabapentin given reported AMS although AOX3 on my exam -Continue oxycodone 5mg q4H PRN  -Hold oxycodone 10mg q4H PRN  -Hold gabapentin given reported AMS although AOX3 on my exam  -lidoderm patch

## 2018-09-11 NOTE — H&P ADULT - PROBLEM SELECTOR PLAN 7
-Check echo  -Consider restarting furosemide in AM if renal function improves w abn EKG w LBBB and poor RWP  -Check echo  -Consider restarting furosemide in AM if renal function improves

## 2018-09-11 NOTE — ADVANCED PRACTICE NURSE CONSULT - ASSESSMENT
This is 89 year old male that was admitted to the hospital on 9/10/2018 for cough and SOB.  PMH- A.fib on coumadin, COPD on intermittent home O2, HTN, HLD, CKD stage 3, lumbar compression fractures s/p fall (08/2018).    Requested by MD to assess patient's BLE. Patient presents resting in an [employer] [address] MRS on his backside with heels elevated off mattress. Son present at the bedside. Dressings removed from both lower extremity. Small amount of serous drainage noted. No odor noted.  Both lower extremities cleansed with soap and water. Lower extremities dried and left open to air. No open/draining wounds noted. Patient remains sitting up in bed with both lower extremities elevated off mattress.

## 2018-09-12 DIAGNOSIS — I42.9 CARDIOMYOPATHY, UNSPECIFIED: ICD-10-CM

## 2018-09-12 LAB
ADD ON TEST-SPECIMEN IN LAB: SIGNIFICANT CHANGE UP
ALBUMIN SERPL ELPH-MCNC: 1.6 G/DL — LOW (ref 3.3–5)
ALP SERPL-CCNC: 103 U/L — SIGNIFICANT CHANGE UP (ref 40–120)
ALT FLD-CCNC: 21 U/L — SIGNIFICANT CHANGE UP (ref 12–78)
ANION GAP SERPL CALC-SCNC: 8 MMOL/L — SIGNIFICANT CHANGE UP (ref 5–17)
AST SERPL-CCNC: 40 U/L — HIGH (ref 15–37)
BILIRUB DIRECT SERPL-MCNC: 0.2 MG/DL — SIGNIFICANT CHANGE UP (ref 0–0.2)
BILIRUB SERPL-MCNC: 0.5 MG/DL — SIGNIFICANT CHANGE UP (ref 0.2–1.2)
BUN SERPL-MCNC: 87 MG/DL — HIGH (ref 7–23)
CALCIUM SERPL-MCNC: 8.4 MG/DL — LOW (ref 8.5–10.1)
CHLORIDE SERPL-SCNC: 104 MMOL/L — SIGNIFICANT CHANGE UP (ref 96–108)
CO2 SERPL-SCNC: 22 MMOL/L — SIGNIFICANT CHANGE UP (ref 22–31)
CREAT SERPL-MCNC: 3.31 MG/DL — HIGH (ref 0.5–1.3)
GLUCOSE SERPL-MCNC: 104 MG/DL — HIGH (ref 70–99)
HCT VFR BLD CALC: 27.5 % — LOW (ref 39–50)
HGB BLD-MCNC: 7.9 G/DL — LOW (ref 13–17)
INR BLD: 1.7 RATIO — HIGH (ref 0.88–1.16)
LEGIONELLA AG UR QL: NEGATIVE — SIGNIFICANT CHANGE UP
MCHC RBC-ENTMCNC: 22.2 PG — LOW (ref 27–34)
MCHC RBC-ENTMCNC: 28.7 GM/DL — LOW (ref 32–36)
MCV RBC AUTO: 77.2 FL — LOW (ref 80–100)
NRBC # BLD: 0 /100 WBCS — SIGNIFICANT CHANGE UP (ref 0–0)
PLATELET # BLD AUTO: 243 K/UL — SIGNIFICANT CHANGE UP (ref 150–400)
POTASSIUM SERPL-MCNC: 5.7 MMOL/L — HIGH (ref 3.5–5.3)
POTASSIUM SERPL-SCNC: 5.7 MMOL/L — HIGH (ref 3.5–5.3)
PROT SERPL-MCNC: 5.4 GM/DL — LOW (ref 6–8.3)
PROTHROM AB SERPL-ACNC: 18.6 SEC — HIGH (ref 9.8–12.7)
RBC # BLD: 3.56 M/UL — LOW (ref 4.2–5.8)
RBC # FLD: 20.2 % — HIGH (ref 10.3–14.5)
SODIUM SERPL-SCNC: 134 MMOL/L — LOW (ref 135–145)
WBC # BLD: 15.25 K/UL — HIGH (ref 3.8–10.5)
WBC # FLD AUTO: 15.25 K/UL — HIGH (ref 3.8–10.5)

## 2018-09-12 PROCEDURE — 76700 US EXAM ABDOM COMPLETE: CPT | Mod: 26

## 2018-09-12 PROCEDURE — 99233 SBSQ HOSP IP/OBS HIGH 50: CPT

## 2018-09-12 RX ORDER — ALPRAZOLAM 0.25 MG
0.25 TABLET ORAL ONCE
Qty: 0 | Refills: 0 | Status: DISCONTINUED | OUTPATIENT
Start: 2018-09-12 | End: 2018-09-12

## 2018-09-12 RX ORDER — SODIUM POLYSTYRENE SULFONATE 4.1 MEQ/G
30 POWDER, FOR SUSPENSION ORAL ONCE
Qty: 0 | Refills: 0 | Status: COMPLETED | OUTPATIENT
Start: 2018-09-12 | End: 2018-09-12

## 2018-09-12 RX ORDER — SODIUM CHLORIDE 9 MG/ML
2000 INJECTION INTRAMUSCULAR; INTRAVENOUS; SUBCUTANEOUS
Qty: 0 | Refills: 0 | Status: DISCONTINUED | OUTPATIENT
Start: 2018-09-12 | End: 2018-09-13

## 2018-09-12 RX ORDER — LEVOTHYROXINE SODIUM 125 MCG
175 TABLET ORAL DAILY
Qty: 0 | Refills: 0 | Status: DISCONTINUED | OUTPATIENT
Start: 2018-09-12 | End: 2018-09-13

## 2018-09-12 RX ORDER — WARFARIN SODIUM 2.5 MG/1
3 TABLET ORAL DAILY
Qty: 0 | Refills: 0 | Status: DISCONTINUED | OUTPATIENT
Start: 2018-09-12 | End: 2018-09-13

## 2018-09-12 RX ADMIN — Medication 1 TABLET(S): at 05:28

## 2018-09-12 RX ADMIN — Medication 110 MILLIGRAM(S): at 05:28

## 2018-09-12 RX ADMIN — Medication 100 MILLIGRAM(S): at 13:23

## 2018-09-12 RX ADMIN — DORZOLAMIDE HYDROCHLORIDE 1 DROP(S): 20 SOLUTION/ DROPS OPHTHALMIC at 05:27

## 2018-09-12 RX ADMIN — PANTOPRAZOLE SODIUM 40 MILLIGRAM(S): 20 TABLET, DELAYED RELEASE ORAL at 08:00

## 2018-09-12 RX ADMIN — LIDOCAINE 1 PATCH: 4 CREAM TOPICAL at 11:51

## 2018-09-12 RX ADMIN — LIDOCAINE 1 PATCH: 4 CREAM TOPICAL at 23:51

## 2018-09-12 RX ADMIN — Medication 3 MILLILITER(S): at 08:29

## 2018-09-12 RX ADMIN — Medication 81 MILLIGRAM(S): at 11:51

## 2018-09-12 RX ADMIN — FINASTERIDE 5 MILLIGRAM(S): 5 TABLET, FILM COATED ORAL at 11:51

## 2018-09-12 RX ADMIN — PIPERACILLIN AND TAZOBACTAM 25 GRAM(S): 4; .5 INJECTION, POWDER, LYOPHILIZED, FOR SOLUTION INTRAVENOUS at 19:32

## 2018-09-12 RX ADMIN — DORZOLAMIDE HYDROCHLORIDE 1 DROP(S): 20 SOLUTION/ DROPS OPHTHALMIC at 13:23

## 2018-09-12 RX ADMIN — Medication 1 TABLET(S): at 18:11

## 2018-09-12 RX ADMIN — OXYCODONE HYDROCHLORIDE 5 MILLIGRAM(S): 5 TABLET ORAL at 23:10

## 2018-09-12 RX ADMIN — SODIUM POLYSTYRENE SULFONATE 30 GRAM(S): 4.1 POWDER, FOR SUSPENSION ORAL at 18:12

## 2018-09-12 RX ADMIN — PIPERACILLIN AND TAZOBACTAM 25 GRAM(S): 4; .5 INJECTION, POWDER, LYOPHILIZED, FOR SOLUTION INTRAVENOUS at 08:01

## 2018-09-12 RX ADMIN — Medication 200 MICROGRAM(S): at 05:47

## 2018-09-12 RX ADMIN — WARFARIN SODIUM 3 MILLIGRAM(S): 2.5 TABLET ORAL at 23:00

## 2018-09-12 RX ADMIN — Medication 100 MILLIGRAM(S): at 05:28

## 2018-09-12 RX ADMIN — Medication 1 TABLET(S): at 11:51

## 2018-09-12 RX ADMIN — OXYCODONE HYDROCHLORIDE 5 MILLIGRAM(S): 5 TABLET ORAL at 19:09

## 2018-09-12 RX ADMIN — Medication 175 MICROGRAM(S): at 11:51

## 2018-09-12 RX ADMIN — OXYCODONE HYDROCHLORIDE 5 MILLIGRAM(S): 5 TABLET ORAL at 19:39

## 2018-09-12 RX ADMIN — Medication 3 MILLILITER(S): at 20:28

## 2018-09-12 RX ADMIN — OXYCODONE HYDROCHLORIDE 5 MILLIGRAM(S): 5 TABLET ORAL at 23:40

## 2018-09-12 RX ADMIN — Medication 110 MILLIGRAM(S): at 18:11

## 2018-09-12 RX ADMIN — SODIUM CHLORIDE 100 MILLILITER(S): 9 INJECTION INTRAMUSCULAR; INTRAVENOUS; SUBCUTANEOUS at 11:55

## 2018-09-12 NOTE — PROGRESS NOTE ADULT - SUBJECTIVE AND OBJECTIVE BOX
HPI:  89 y/o man with a history of  COPD (uses continuous supplemental oxygen), chronic atrial fibrillation, valvular heart disease (MR, TR, PI, AI), hypertension, chronic lower extremity edema, recent traumatic rib / lumbar fractures s/p fall (late 2018), admitted on 9/10/18 with pneumonia, COPD exacerbation.     - states he is in pain but unable to rate or specify location     ROS:   All 10 systems reviewed and found to be negative with the exception of what has been described above.    Vital Signs Last 24 Hrs  T(C): 36.9 (12 Sep 2018 04:56), Max: 36.9 (12 Sep 2018 04:56)  T(F): 98.4 (12 Sep 2018 04:56), Max: 98.4 (12 Sep 2018 04:56)  HR: 74 (12 Sep 2018 07:29) (67 - 78)  BP: 112/39 (12 Sep 2018 04:56) (107/53 - 112/39)  BP(mean): --  RR: 19 (12 Sep 2018 04:56) (19 - 20)  SpO2: 99% (12 Sep 2018 04:56) (94% - 99%)    GEN: lying in bed, NAD  HEENT:   NC/AT, pupils equal and reactive, EOMI  CV:  +S1, +S2, RRR  RESP:   lungs clear to auscultation bilaterally, no wheeze, rales, rhonchi   BREAST:  not examined  GI:  abdomen soft, non-tender, non-distended, normoactive BS  RECTAL:  not examined  :  not examined  MSK:   normal muscle tone  EXT:  no edema  NEURO:  AAOX3, no focal neurological deficits  SKIN:  no rashes                              7.9    15.25 )-----------( 243      ( 12 Sep 2018 10:37 )             27.5     09-12    134<L>  |  104  |  87<H>  ----------------------------<  104<H>  5.7<H>   |  22  |  3.31<H>    Ca    8.4<L>      12 Sep 2018 10:37  Phos  5.9     09-11  Mg     2.1     09-11    TPro  5.4<L>  /  Alb  1.6<L>  /  TBili  0.5  /  DBili  0.2  /  AST  40<H>  /  ALT  21  /  AlkPhos  103  09-12    CARDIAC MARKERS ( 11 Sep 2018 06:33 )  0.016 ng/mL / x     / x     / x     / x      CARDIAC MARKERS ( 10 Sep 2018 16:17 )  <0.015 ng/mL / x     / x     / x     / x          LIVER FUNCTIONS - ( 12 Sep 2018 10:37 )  Alb: 1.6 g/dL / Pro: 5.4 gm/dL / ALK PHOS: 103 U/L / ALT: 21 U/L / AST: 40 U/L / GGT: x           PT/INR - ( 12 Sep 2018 10:37 )   PT: 18.6 sec;   INR: 1.70 ratio         PTT - ( 10 Sep 2018 16:17 )  PTT:47.0 sec  Urinalysis Basic - ( 10 Sep 2018 16:17 )    Color: Yellow / Appearance: Clear / S.015 / pH: x  Gluc: x / Ketone: Negative  / Bili: Small / Urobili: Negative mg/dL   Blood: x / Protein: Negative mg/dL / Nitrite: Negative   Leuk Esterase: Trace / RBC: 6-10 /HPF / WBC 6-10   Sq Epi: x / Non Sq Epi: Occasional / Bacteria: Occasional      Assessment & Plan    # Acute respiratory failure. B/l pneumonia. COPD exacerbation. Cholelithiasis. Possible acute cholecystis. BPH. ARF on CRF stage 4. Encephalopathy.  -respiratory function is improving  -leukocytosis  -pedal edema  - BC x 2, urine - no growth  -on zosyn 3.375 gm IV q12h and doxycycline 100 mg IV q12h # 2  -tolerating abx well so far; no side effects noted  -f/u BMP  -continue abx coverage  -elevate legs  -monitor temps  -f/u CBC  -supportive care  - sepsis 2/2 HCAP suspected gram neg ed pna - ID eval noted    #Atrial fibrillation  - Controlled  - consider holding warfarin given decreasing Hgb, potential cholecystitis.     # HTN   - BP has been low-normal/mildly hypotensive likely related to infectious process  - Consider IVF given worsening renal parameters     # Valvular heart disease  - Echo findings reviewed - mild/moderate valvular disease    #Cardiomyopathy  - Mild LV + RV dysfunction  - Outpatient use of furosemid - now being held HPI:  89 y/o man with a history of  COPD (uses continuous supplemental oxygen), chronic atrial fibrillation, valvular heart disease (MR, TR, PI, AI), hypertension, chronic lower extremity edema, recent traumatic rib / lumbar fractures s/p fall (late 2018), admitted on 9/10/18 with pneumonia, COPD exacerbation.     - states he is in pain but unable to rate or specify location     ROS:   All 10 systems reviewed and found to be negative with the exception of what has been described above.    Vital Signs Last 24 Hrs  T(C): 36.9 (12 Sep 2018 04:56), Max: 36.9 (12 Sep 2018 04:56)  T(F): 98.4 (12 Sep 2018 04:56), Max: 98.4 (12 Sep 2018 04:56)  HR: 74 (12 Sep 2018 07:29) (67 - 78)  BP: 112/39 (12 Sep 2018 04:56) (107/53 - 112/39)  BP(mean): --  RR: 19 (12 Sep 2018 04:56) (19 - 20)  SpO2: 99% (12 Sep 2018 04:56) (94% - 99%)    GEN: lying in bed, NAD  HEENT:   NC/AT, pupils equal and reactive, EOMI  CV:  +S1, +S2, RRR  RESP:   lungs clear to auscultation bilaterally, no wheeze, rales, rhonchi   BREAST:  not examined  GI:  abdomen soft, non-tender, non-distended, normoactive BS  RECTAL:  not examined  :  not examined  MSK:   normal muscle tone  EXT:  no edema  NEURO:  AAOX3, no focal neurological deficits  SKIN:  no rashes                              7.9    15.25 )-----------( 243      ( 12 Sep 2018 10:37 )             27.5     09-12    134<L>  |  104  |  87<H>  ----------------------------<  104<H>  5.7<H>   |  22  |  3.31<H>    Ca    8.4<L>      12 Sep 2018 10:37  Phos  5.9     09-11  Mg     2.1     09-11    TPro  5.4<L>  /  Alb  1.6<L>  /  TBili  0.5  /  DBili  0.2  /  AST  40<H>  /  ALT  21  /  AlkPhos  103  09-12    CARDIAC MARKERS ( 11 Sep 2018 06:33 )  0.016 ng/mL / x     / x     / x     / x      CARDIAC MARKERS ( 10 Sep 2018 16:17 )  <0.015 ng/mL / x     / x     / x     / x          LIVER FUNCTIONS - ( 12 Sep 2018 10:37 )  Alb: 1.6 g/dL / Pro: 5.4 gm/dL / ALK PHOS: 103 U/L / ALT: 21 U/L / AST: 40 U/L / GGT: x           PT/INR - ( 12 Sep 2018 10:37 )   PT: 18.6 sec;   INR: 1.70 ratio         PTT - ( 10 Sep 2018 16:17 )  PTT:47.0 sec  Urinalysis Basic - ( 10 Sep 2018 16:17 )    Color: Yellow / Appearance: Clear / S.015 / pH: x  Gluc: x / Ketone: Negative  / Bili: Small / Urobili: Negative mg/dL   Blood: x / Protein: Negative mg/dL / Nitrite: Negative   Leuk Esterase: Trace / RBC: 6-10 /HPF / WBC 6-10   Sq Epi: x / Non Sq Epi: Occasional / Bacteria: Occasional      Assessment & Plan  89 yo male with PMH as per HPI here with pneumonia and now with abd pain     #abd pain - r/o cholecysitis - pt adequately covered with abx   - RUQ sono done will f/u results  - unable to do HIDA due to ARF  - f/u labs  - pain meds  - NPO except meds for now   - IVF    # Acute respiratory failure. B/l pneumonia. COPD exacerbation. Cholelithiasis. Possible acute cholecystis. BPH. ARF on CRF stage 4. Encephalopathy.  -respiratory function is improving  -leukocytosis  -pedal edema  - BC x 2, urine - no growth  -on zosyn 3.375 gm IV q12h and doxycycline 100 mg IV q12h # 2  -tolerating abx well so far; no side effects noted  -elevate legs  -monitor temps  -f/u CBC  -supportive care  - sepsis 2/2 HCAP suspected gram neg ed pna - ID eval noted    #Atrial fibrillation  - Controlled  - INR goal 2-3     # anemia - hgb at baseline and will transfuse for hgb < 7   - hgb stable     # HTN   - BP has been low-normal/mildly hypotensive likely related to infectious process  - Consider IVF given worsening renal parameters     # Valvular heart disease  - Echo findings reviewed - mild/moderate valvular disease    #Cardiomyopathy  - Mild LV + RV dysfunction  - Outpatient use of furosemide - now being held

## 2018-09-12 NOTE — PROGRESS NOTE ADULT - SUBJECTIVE AND OBJECTIVE BOX
Date of service: 18 @ 09:43    Lying in bed in [facility]  SOB is improved  Legs feel less swollen  Has b/l ankles and feet erythema    ROS: no fever or chills; denies dizziness, no HA, no SOB or cough, no abdominal pain, no diarrhea or constipation; no dysuria, no legs pain    MEDICATIONS  (STANDING):  ALBUTerol/ipratropium for Nebulization 3 milliLiter(s) Nebulizer every 8 hours  aspirin enteric coated 81 milliGRAM(s) Oral daily  atorvastatin 40 milliGRAM(s) Oral at bedtime  calcium carbonate 1250 mG  + Vitamin D (OsCal 500 + D) 1 Tablet(s) Oral two times a day  docusate sodium 100 milliGRAM(s) Oral three times a day  dorzolamide 2% Ophthalmic Solution 1 Drop(s) Both EYES three times a day  dorzolamide 2% Ophthalmic Solution      doxycycline IVPB 100 milliGRAM(s) IV Intermittent every 12 hours  doxycycline IVPB      finasteride 5 milliGRAM(s) Oral daily  influenza   Vaccine 0.5 milliLiter(s) IntraMuscular once  latanoprost 0.005% Ophthalmic Solution 1 Drop(s) Both EYES at bedtime  levothyroxine 200 MICROGram(s) Oral daily  lidocaine   Patch 1 Patch Transdermal daily  multivitamin 1 Tablet(s) Oral daily  pantoprazole    Tablet 40 milliGRAM(s) Oral before breakfast  piperacillin/tazobactam IVPB. 3.375 Gram(s) IV Intermittent every 12 hours  senna 2 Tablet(s) Oral at bedtime  sodium chloride 0.9%. 2000 milliLiter(s) (100 mL/Hr) IV Continuous <Continuous>  tamsulosin 0.4 milliGRAM(s) Oral at bedtime  warfarin 2.5 milliGRAM(s) Oral at bedtime      Vital Signs Last 24 Hrs  T(C): 36.9 (12 Sep 2018 04:56), Max: 36.9 (12 Sep 2018 04:56)  T(F): 98.4 (12 Sep 2018 04:56), Max: 98.4 (12 Sep 2018 04:56)  HR: 74 (12 Sep 2018 07:29) (67 - 83)  BP: 112/39 (12 Sep 2018 04:56) (95/68 - 112/39)  BP(mean): --  RR: 19 (12 Sep 2018 04:56) (19 - 20)  SpO2: 99% (12 Sep 2018 04:56) (94% - 99%)    Physical Exam:      Constitutional: frail looking  HEENT: NC/AT, EOMI, PERRLA, conjunctivae clear  Neck: supple; thyroid not palpable  Back: no tenderness  Respiratory: respiratory effort normal; crackles at base  Cardiovascular: S1S2 regular, no murmurs  Abdomen: soft, not tender, not distended, positive BS  Genitourinary: no suprapubic tenderness  Lymphatic: no LN palpable  Musculoskeletal: no muscle tenderness, no joint swelling or tenderness  Extremities: 3+ pedal edema; ankle erythema and scant serous discharge  Neurological/ Psychiatric: AxOx3, moving all extremities  Skin: no rashes; no palpable lesions    Labs: reviewed                                   7.9    26.78 )-----------( 274      ( 11 Sep 2018 06:33 )             27.1     09-11    134<L>  |  103  |  82<H>  ----------------------------<  84  5.4<H>   |  21<L>  |  3.12<H>    Ca    8.1<L>      11 Sep 2018 06:33  Phos  5.9     09-11  Mg     2.1     09-11    TPro  6.1  /  Alb  2.0<L>  /  TBili  0.4  /  DBili  x   /  AST  30  /  ALT  25  /  AlkPhos  111  09-10     LIVER FUNCTIONS - ( 10 Sep 2018 16:17 )  Alb: 2.0 g/dL / Pro: 6.1 gm/dL / ALK PHOS: 111 U/L / ALT: 25 U/L / AST: 30 U/L / GGT: x           Urinalysis Basic - ( 10 Sep 2018 16:17 )    Color: Yellow / Appearance: Clear / S.015 / pH: x  Gluc: x / Ketone: Negative  / Bili: Small / Urobili: Negative mg/dL   Blood: x / Protein: Negative mg/dL / Nitrite: Negative   Leuk Esterase: Trace / RBC: 6-10 /HPF / WBC 6-10   Sq Epi: x / Non Sq Epi: Occasional / Bacteria: Occasional      Culture - Urine (collected 10 Sep 2018 16:17)  Source: .Urine None  Final Report (11 Sep 2018 22:17):    No growth    Culture - Blood (collected 10 Sep 2018 16:17)  Source: .Blood None  Preliminary Report (12 Sep 2018 01:02):    No growth to date.    Culture - Blood (collected 10 Sep 2018 16:17)  Source: .Blood None  Preliminary Report (12 Sep 2018 01:02):    No growth to date.          Radiology: all available radiological tests reviewed    < from: CT Abdomen and Pelvis No Cont (09.10.18 @ 19:20) >  Extensive bibasilar pneumonia. Right pleural effusion.  Distended gallbladder with cholelithiasis. If warranted correlate with   ultrasound and/or HIDA scan  Markedly enlarged prostate.    < end of copied text >      Advanced directives addressed: full resuscitation

## 2018-09-12 NOTE — PROGRESS NOTE ADULT - PROBLEM SELECTOR PLAN 1
Management as per primary service; prognosis is guarded - multiple clinical predictors of poor outcome; patient has advanced directives.

## 2018-09-12 NOTE — PROGRESS NOTE ADULT - ASSESSMENT
89 y/o Male with h/o A.fib on coumadin, COPD on intermittent home O2, HTN, HLD, CKD stage 3, lumbar compression fractures s/p fall (08/2018) was admitted on 9/11 for cough and SOB. His symptoms have been gradually progressive over the past 2-3 days PTA. Cough is productive for sputum. No associated fever or chills, but has fatigue, anorexia and is reported more confused. [facility] to [facility] for evaluation. In the ED, patient received cefepime, vancomycin IV x 1.    1. Acute respiratory failure. B/l pneumonia. COPD exacerbation. Cholelithiasis. Possible acute cholecystis. BPH. ARF on CRF stage 4. Encephalopathy.  -respiratory function is improving  -leukocytosis  -pedal edema  -f/u BC x 2, urine c/s  -on zosyn 3.375 gm IV q12h and doxycycline 100 mg IV q12h # 2  -tolerating abx well so far; no side effects noted  -f/u BMP  -continue abx coverage  -elevate legs  -monitor temps  -f/u CBC  -supportive care  2. Other issues:   -care per medicine

## 2018-09-12 NOTE — PROGRESS NOTE ADULT - PROBLEM SELECTOR PLAN 3
BP has been low-normal / mildly hypotensive likely related to infectious process; consider IVF given worsening renal parameters.

## 2018-09-12 NOTE — PROGRESS NOTE ADULT - SUBJECTIVE AND OBJECTIVE BOX
REASON FOR VISIT: SOB    HPI:  89 y/o man with a history of  COPD (uses continuous supplemental oxygen), chronic atrial fibrillation, valvular heart disease (MR, TR, PI, AI), hypertension, chronic lower extremity edema, recent traumatic rib / lumbar fractures s/p fall (late July 2018), admitted on 9/10/18 with pneumonia, COPD exacerbation.    9/12/18:  "I don't know how I feel."  "I have pain everywhere."    MEDICATIONS  (STANDING):  ALBUTerol/ipratropium for Nebulization 3 milliLiter(s) Nebulizer every 8 hours  aspirin enteric coated 81 milliGRAM(s) Oral daily  atorvastatin 40 milliGRAM(s) Oral at bedtime  calcium carbonate 1250 mG  + Vitamin D (OsCal 500 + D) 1 Tablet(s) Oral two times a day  docusate sodium 100 milliGRAM(s) Oral three times a day  dorzolamide 2% Ophthalmic Solution 1 Drop(s) Both EYES three times a day  doxycycline IVPB 100 milliGRAM(s) IV Intermittent every 12 hours  finasteride 5 milliGRAM(s) Oral daily  influenza   Vaccine 0.5 milliLiter(s) IntraMuscular once  latanoprost 0.005% Ophthalmic Solution 1 Drop(s) Both EYES at bedtime  levothyroxine 200 MICROGram(s) Oral daily  lidocaine   Patch 1 Patch Transdermal daily  multivitamin 1 Tablet(s) Oral daily  pantoprazole    Tablet 40 milliGRAM(s) Oral before breakfast  piperacillin/tazobactam IVPB. 3.375 Gram(s) IV Intermittent every 12 hours  senna 2 Tablet(s) Oral at bedtime  tamsulosin 0.4 milliGRAM(s) Oral at bedtime  warfarin 2.5 milliGRAM(s) Oral at bedtime    MEDICATIONS  (PRN):  acetaminophen   Tablet .. 650 milliGRAM(s) Oral every 6 hours PRN Temp greater or equal to 38C (100.4F), Mild Pain (1 - 3)  ALBUTerol/ipratropium for Nebulization 3 milliLiter(s) Nebulizer every 4 hours PRN Shortness of Breath and/or Wheezing  oxyCODONE    IR 5 milliGRAM(s) Oral every 4 hours PRN Moderate Pain (4 - 6)    Vital Signs Last 24 Hrs  T(C): 36.9 (12 Sep 2018 04:56), Max: 36.9 (12 Sep 2018 04:56)  T(F): 98.4 (12 Sep 2018 04:56), Max: 98.4 (12 Sep 2018 04:56)  HR: 67 (12 Sep 2018 04:56) (67 - 83)  BP: 112/39 (12 Sep 2018 04:56) (95/68 - 112/39)  RR: 19 (12 Sep 2018 04:56) (19 - 20)  SpO2: 99% (12 Sep 2018 04:56) (94% - 99%)    PHYSICAL EXAM:  Constitutional: Ill-appearing, supine/flat in bed  Pulmonary: Non-labored but with pursed lip breathing, supplemental O2 via N/C; scattered rhonchi, transmitted upper airway sounds  Cardiovascular: S1 and S2, regular  Gastrointestinal: Bowel Sounds present, soft, diffuse tenderness with deep palpation, no guarding   Lymph: + pedal edema.   Skin: Warm, dry    LABS:                     CARDIAC MARKERS ( 11 Sep 2018 06:33 ) 0.016 ng/mL / x     / x     / x     / x      CARDIAC MARKERS ( 10 Sep 2018 16:17 ) <0.015 ng/mL / x     / x     / x     / x                    7.9    26.78 )-----------( 274      ( 11 Sep 2018 06:33 )             27.1     134<L>  |  103  |  82<H>  ----------------------------<  84  5.4<H>   |  21<L>  |  3.12<H>    Ca    8.1<L>      11 Sep 2018 06:33  Phos  5.9     09-11  Mg     2.1     09-11    TPro  6.1  /  Alb  2.0<L>  /  TBili  0.4  /  DBili  x   /  AST  30  /  ALT  25  /  AlkPhos  111  09-10    Tele: AF    CT Abdomen and Pelvis No Cont (09.10.18 @ 19:20): Extensive bibasilar pneumonia. Right pleural effusion.Distended gallbladder with cholelithiasis. If warranted correlate with ultrasound and/or HIDA scan.  Markedly enlarged prostate.    Transthoracic Echocardiogram (09.11.18 @ 14:08):   Left ventricle systolic function appears mildly impaired; segmental wall motion abnormalities noted. Estimated Ejection Fraction is 45-50%.   The left atrium is moderately dilated.   The right atrium appears mildly dilated.   The right ventricle exhibits mild dilation, mild diffuse hypokinesis, and mild depression of contractility.   Mild aortic stenosis.    Mild mitral regurgitation.   Mild to Moderate tricuspid regurgitation.   Mild dilatation of the ascending aortic segment.

## 2018-09-13 ENCOUNTER — TRANSCRIPTION ENCOUNTER (OUTPATIENT)
Age: 83
End: 2018-09-13

## 2018-09-13 LAB
ALBUMIN SERPL ELPH-MCNC: 1.7 G/DL — LOW (ref 3.3–5)
ALP SERPL-CCNC: 112 U/L — SIGNIFICANT CHANGE UP (ref 40–120)
ALT FLD-CCNC: 20 U/L — SIGNIFICANT CHANGE UP (ref 12–78)
ANION GAP SERPL CALC-SCNC: 14 MMOL/L — SIGNIFICANT CHANGE UP (ref 5–17)
AST SERPL-CCNC: 47 U/L — HIGH (ref 15–37)
BILIRUB SERPL-MCNC: 0.5 MG/DL — SIGNIFICANT CHANGE UP (ref 0.2–1.2)
BUN SERPL-MCNC: 87 MG/DL — HIGH (ref 7–23)
CALCIUM SERPL-MCNC: 8.7 MG/DL — SIGNIFICANT CHANGE UP (ref 8.5–10.1)
CHLORIDE SERPL-SCNC: 107 MMOL/L — SIGNIFICANT CHANGE UP (ref 96–108)
CO2 SERPL-SCNC: 19 MMOL/L — LOW (ref 22–31)
CREAT SERPL-MCNC: 3.02 MG/DL — HIGH (ref 0.5–1.3)
GLUCOSE SERPL-MCNC: 82 MG/DL — SIGNIFICANT CHANGE UP (ref 70–99)
HCT VFR BLD CALC: 27.1 % — LOW (ref 39–50)
HGB BLD-MCNC: 7.8 G/DL — LOW (ref 13–17)
INR BLD: 1.87 RATIO — HIGH (ref 0.88–1.16)
MCHC RBC-ENTMCNC: 22.3 PG — LOW (ref 27–34)
MCHC RBC-ENTMCNC: 28.8 GM/DL — LOW (ref 32–36)
MCV RBC AUTO: 77.7 FL — LOW (ref 80–100)
NRBC # BLD: 0 /100 WBCS — SIGNIFICANT CHANGE UP (ref 0–0)
PLATELET # BLD AUTO: 264 K/UL — SIGNIFICANT CHANGE UP (ref 150–400)
POTASSIUM SERPL-MCNC: 4.3 MMOL/L — SIGNIFICANT CHANGE UP (ref 3.5–5.3)
POTASSIUM SERPL-SCNC: 4.3 MMOL/L — SIGNIFICANT CHANGE UP (ref 3.5–5.3)
PROT SERPL-MCNC: 5.3 GM/DL — LOW (ref 6–8.3)
PROTHROM AB SERPL-ACNC: 20.4 SEC — HIGH (ref 9.8–12.7)
RBC # BLD: 3.49 M/UL — LOW (ref 4.2–5.8)
RBC # FLD: 20.5 % — HIGH (ref 10.3–14.5)
S PNEUM AG UR QL: NEGATIVE — SIGNIFICANT CHANGE UP
SODIUM SERPL-SCNC: 140 MMOL/L — SIGNIFICANT CHANGE UP (ref 135–145)
WBC # BLD: 12.05 K/UL — HIGH (ref 3.8–10.5)
WBC # FLD AUTO: 12.05 K/UL — HIGH (ref 3.8–10.5)

## 2018-09-13 PROCEDURE — 99232 SBSQ HOSP IP/OBS MODERATE 35: CPT

## 2018-09-13 RX ORDER — HYDROMORPHONE HYDROCHLORIDE 2 MG/ML
1 INJECTION INTRAMUSCULAR; INTRAVENOUS; SUBCUTANEOUS
Qty: 0 | Refills: 0 | COMMUNITY
Start: 2018-09-13

## 2018-09-13 RX ORDER — LORATADINE 10 MG/1
1 TABLET ORAL
Qty: 0 | Refills: 0 | COMMUNITY

## 2018-09-13 RX ORDER — HYDROMORPHONE HYDROCHLORIDE 2 MG/ML
1 INJECTION INTRAMUSCULAR; INTRAVENOUS; SUBCUTANEOUS
Qty: 0 | Refills: 0 | Status: DISCONTINUED | OUTPATIENT
Start: 2018-09-13 | End: 2018-09-13

## 2018-09-13 RX ORDER — CEFOXITIN 1 G/1
1 INJECTION, POWDER, FOR SOLUTION INTRAVENOUS
Qty: 0 | Refills: 0 | COMMUNITY

## 2018-09-13 RX ORDER — LATANOPROST 0.05 MG/ML
1 SOLUTION/ DROPS OPHTHALMIC; TOPICAL
Qty: 0 | Refills: 0 | COMMUNITY

## 2018-09-13 RX ORDER — OXYCODONE HYDROCHLORIDE 5 MG/1
10 TABLET ORAL EVERY 4 HOURS
Qty: 0 | Refills: 0 | Status: DISCONTINUED | OUTPATIENT
Start: 2018-09-13 | End: 2018-09-13

## 2018-09-13 RX ORDER — FERROUS SULFATE 325(65) MG
1 TABLET ORAL
Qty: 0 | Refills: 0 | COMMUNITY

## 2018-09-13 RX ORDER — HYDROMORPHONE HYDROCHLORIDE 2 MG/ML
1 INJECTION INTRAMUSCULAR; INTRAVENOUS; SUBCUTANEOUS ONCE
Qty: 0 | Refills: 0 | Status: DISCONTINUED | OUTPATIENT
Start: 2018-09-13 | End: 2018-09-13

## 2018-09-13 RX ORDER — WARFARIN SODIUM 2.5 MG/1
1 TABLET ORAL
Qty: 0 | Refills: 0 | COMMUNITY

## 2018-09-13 RX ORDER — FINASTERIDE 5 MG/1
1 TABLET, FILM COATED ORAL
Qty: 0 | Refills: 0 | COMMUNITY

## 2018-09-13 RX ORDER — ISOSORBIDE MONONITRATE 60 MG/1
1 TABLET, EXTENDED RELEASE ORAL
Qty: 0 | Refills: 0 | COMMUNITY

## 2018-09-13 RX ORDER — TAMSULOSIN HYDROCHLORIDE 0.4 MG/1
1 CAPSULE ORAL
Qty: 0 | Refills: 0 | COMMUNITY

## 2018-09-13 RX ORDER — MINOCYCLINE HYDROCHLORIDE 45 MG/1
1 TABLET, EXTENDED RELEASE ORAL
Qty: 0 | Refills: 0 | COMMUNITY

## 2018-09-13 RX ORDER — ASPIRIN/CALCIUM CARB/MAGNESIUM 324 MG
1 TABLET ORAL
Qty: 0 | Refills: 0 | COMMUNITY

## 2018-09-13 RX ORDER — MORPHINE SULFATE 50 MG/1
2 CAPSULE, EXTENDED RELEASE ORAL ONCE
Qty: 0 | Refills: 0 | Status: DISCONTINUED | OUTPATIENT
Start: 2018-09-13 | End: 2018-09-13

## 2018-09-13 RX ORDER — ROSUVASTATIN CALCIUM 5 MG/1
1 TABLET ORAL
Qty: 0 | Refills: 0 | COMMUNITY

## 2018-09-13 RX ORDER — HYDROMORPHONE HYDROCHLORIDE 2 MG/ML
0.5 INJECTION INTRAMUSCULAR; INTRAVENOUS; SUBCUTANEOUS
Qty: 0 | Refills: 0 | COMMUNITY
Start: 2018-09-13

## 2018-09-13 RX ORDER — HYDROMORPHONE HYDROCHLORIDE 2 MG/ML
0.5 INJECTION INTRAMUSCULAR; INTRAVENOUS; SUBCUTANEOUS
Qty: 0 | Refills: 0 | Status: DISCONTINUED | OUTPATIENT
Start: 2018-09-13 | End: 2018-09-13

## 2018-09-13 RX ORDER — FUROSEMIDE 40 MG
1.5 TABLET ORAL
Qty: 0 | Refills: 0 | COMMUNITY

## 2018-09-13 RX ORDER — BRINZOLAMIDE 10 MG/ML
1 SUSPENSION/ DROPS OPHTHALMIC
Qty: 0 | Refills: 0 | COMMUNITY

## 2018-09-13 RX ADMIN — HYDROMORPHONE HYDROCHLORIDE 1 MILLIGRAM(S): 2 INJECTION INTRAMUSCULAR; INTRAVENOUS; SUBCUTANEOUS at 13:58

## 2018-09-13 RX ADMIN — PIPERACILLIN AND TAZOBACTAM 25 GRAM(S): 4; .5 INJECTION, POWDER, LYOPHILIZED, FOR SOLUTION INTRAVENOUS at 07:39

## 2018-09-13 RX ADMIN — HYDROMORPHONE HYDROCHLORIDE 0.5 MILLIGRAM(S): 2 INJECTION INTRAMUSCULAR; INTRAVENOUS; SUBCUTANEOUS at 15:23

## 2018-09-13 RX ADMIN — Medication 0.5 MILLIGRAM(S): at 16:27

## 2018-09-13 RX ADMIN — OXYCODONE HYDROCHLORIDE 10 MILLIGRAM(S): 5 TABLET ORAL at 04:45

## 2018-09-13 RX ADMIN — MORPHINE SULFATE 2 MILLIGRAM(S): 50 CAPSULE, EXTENDED RELEASE ORAL at 00:52

## 2018-09-13 RX ADMIN — MORPHINE SULFATE 2 MILLIGRAM(S): 50 CAPSULE, EXTENDED RELEASE ORAL at 00:22

## 2018-09-13 RX ADMIN — HYDROMORPHONE HYDROCHLORIDE 0.5 MILLIGRAM(S): 2 INJECTION INTRAMUSCULAR; INTRAVENOUS; SUBCUTANEOUS at 18:31

## 2018-09-13 NOTE — PROGRESS NOTE ADULT - SUBJECTIVE AND OBJECTIVE BOX
REASON FOR VISIT: SOB, AF    HPI:  89 y/o man with a history of  COPD (uses continuous supplemental oxygen), chronic atrial fibrillation, valvular heart disease (MR, TR, PI, AI), hypertension, chronic lower extremity edema, recent traumatic rib / lumbar fractures s/p fall (late July 2018), admitted on 9/10/18 with pneumonia, COPD exacerbation.    9/12/18:  "I don't know how I feel."  "I have pain everywhere."  9/13/18:  Drowsy from recent dose of Dilaudid awake.    MEDICATIONS  (STANDING):  aspirin enteric coated 81 milliGRAM(s) Oral daily  atorvastatin 40 milliGRAM(s) Oral at bedtime  calcium carbonate 1250 mG  + Vitamin D (OsCal 500 + D) 1 Tablet(s) Oral two times a day  docusate sodium 100 milliGRAM(s) Oral three times a day  dorzolamide 2% Ophthalmic Solution 1 Drop(s) Both EYES three times a day  doxycycline IVPB 100 milliGRAM(s) IV Intermittent every 12 hours  finasteride 5 milliGRAM(s) Oral daily  influenza   Vaccine 0.5 milliLiter(s) IntraMuscular once  latanoprost 0.005% Ophthalmic Solution 1 Drop(s) Both EYES at bedtime  levothyroxine 175 MICROGram(s) Oral daily  lidocaine   Patch 1 Patch Transdermal daily  multivitamin 1 Tablet(s) Oral daily  pantoprazole    Tablet 40 milliGRAM(s) Oral before breakfast  piperacillin/tazobactam IVPB. 3.375 Gram(s) IV Intermittent every 12 hours  senna 2 Tablet(s) Oral at bedtime  sodium chloride 0.9%. 2000 milliLiter(s) (100 mL/Hr) IV Continuous <Continuous>  tamsulosin 0.4 milliGRAM(s) Oral at bedtime  warfarin 3 milliGRAM(s) Oral daily    MEDICATIONS  (PRN):  acetaminophen   Tablet .. 650 milliGRAM(s) Oral every 6 hours PRN Temp greater or equal to 38C (100.4F), Mild Pain (1 - 3)  ALBUTerol/ipratropium for Nebulization 3 milliLiter(s) Nebulizer every 4 hours PRN Shortness of Breath and/or Wheezing  oxyCODONE    IR 5 milliGRAM(s) Oral every 4 hours PRN Moderate Pain (4 - 6)  oxyCODONE    IR 10 milliGRAM(s) Oral every 4 hours PRN Severe Pain (7 - 10)    Vital Signs Last 24 Hrs  T(C): 36.3 (13 Sep 2018 04:42), Max: 36.3 (13 Sep 2018 04:42)  T(F): 97.4 (13 Sep 2018 04:42), Max: 97.4 (13 Sep 2018 04:42)  HR: 73 (13 Sep 2018 04:42) (70 - 73)  BP: 110/33 (13 Sep 2018 04:42) (110/33 - 110/33)  SpO2: 96% (13 Sep 2018 04:42) (96% - 96%)    PHYSICAL EXAM:  Constitutional: Ill-appearing, drowsy  Pulmonary: Non-labored, supplemental O2 via N/C; scattered rhonchi  Cardiovascular: S1 and S2, regular  Gastrointestinal: Bowel Sounds present, soft, diffuse tenderness with deep palpation, no guarding   Lymph: + pedal edema.     LABS:                          7.8    12.05 )-----------( 264      ( 13 Sep 2018 05:59 )             27.1     140  |  107  |  87<H>  ----------------------------<  82  4.3   |  19<L>  |  3.02<H>    Ca    8.7      13 Sep 2018 05:59    TPro  5.3<L>  /  Alb  1.7<L>  /  TBili  0.5  /  DBili  x   /  AST  47<H>  /  ALT  20  /  AlkPhos  112  09-13      Tele: AF (refusing to wear)    CT Abdomen and Pelvis No Cont (09.10.18 @ 19:20): Extensive bibasilar pneumonia. Right pleural effusion.Distended gallbladder with cholelithiasis. If warranted correlate with ultrasound and/or HIDA scan.  Markedly enlarged prostate.    Transthoracic Echocardiogram (09.11.18 @ 14:08):   Left ventricle systolic function appears mildly impaired; segmental wall motion abnormalities noted. Estimated Ejection Fraction is 45-50%.   The left atrium is moderately dilated.   The right atrium appears mildly dilated.   The right ventricle exhibits mild dilation, mild diffuse hypokinesis, and mild depression of contractility.   Mild aortic stenosis.    Mild mitral regurgitation.   Mild to Moderate tricuspid regurgitation.   Mild dilatation of the ascending aortic segment.

## 2018-09-13 NOTE — DISCHARGE NOTE ADULT - MEDICATION SUMMARY - MEDICATIONS TO CHANGE
KENNETH Frank
I will SWITCH the dose or number of times a day I take the medications listed below when I get home from the hospital:  None

## 2018-09-13 NOTE — DISCHARGE NOTE ADULT - MEDICATION SUMMARY - MEDICATIONS TO TAKE
I will START or STAY ON the medications listed below when I get home from the hospital:    HYDROmorphone  -- 0.5 milligram(s) intravenous every 2 hours, As Needed PAIN OR DYSPNEA  -- Indication: For for pain    HYDROmorphone  -- 1 milligram(s) intravenous every 2 hours, As Needed SEVERE PAIN OR DYSPNEA  -- Indication: For for pain    LORazepam  -- 0.5 milligram(s) intravenous every 4 hours, As Needed ANXIETY OR AGITATION  -- Indication: For for anxiety    gabapentin 300 mg oral tablet  -- 1 tab(s) by mouth once a day  -- Indication: For for pain    ALPRAZolam  -- 0.25 milligram(s) by mouth every 6 hours, As Needed  -- Indication: For for anxiety    albuterol 2.5 mg/3 mL (0.083%) inhalation solution  -- 1 dose(s) inhaled every 6 hours, As Needed  -- Indication: For breathing treatment    amLODIPine 10 mg oral tablet  -- 1 tab(s) by mouth once a day  -- Indication: For for blood pressure    Ceftin 500 mg oral tablet  -- 1 tab(s) by mouth every 12 hours X 5 days  -- Indication: For Antibiotic    docusate sodium 100 mg oral capsule  -- 1 cap(s) by mouth 3 times a day  -- Indication: For stool softner    Senna 8.6 mg oral tablet  -- 2 tab(s) by mouth once a day (at bedtime)  -- Indication: For stool softner    Synthroid 200 mcg (0.2 mg) oral tablet  -- 1 tab(s) by mouth once a day (in the morning)  -- Indication: For thyroid med

## 2018-09-13 NOTE — DISCHARGE NOTE ADULT - MEDICATION SUMMARY - MEDICATIONS TO STOP TAKING
I will STOP taking the medications listed below when I get home from the hospital:    latanoprost 0.005% ophthalmic solution  -- 1 drop(s) in each eye once a day (in the evening)    Azopt 1% ophthalmic suspension  -- 1 drop(s) in each eye 2 times a day    rosuvastatin 10 mg oral tablet  -- 1 tab(s) by mouth once a day (at bedtime)    amLODIPine 10 mg oral tablet  -- 1 tab(s) by mouth once a day    aspirin 81 mg oral tablet  -- 1 tab(s) by mouth once a day, As Needed    Calcium 600+D oral tablet  -- 1 tab(s) by mouth 2 times a day    acetaminophen 650 mg oral tablet  -- 2 tab(s) by mouth 2 times a day **    oxyCODONE 5 mg oral tablet  -- 1 tab(s) by mouth every 4 hours, As needed, Moderate Pain (4 - 6)    oxyCODONE 10 mg oral tablet  -- 1 tab(s) by mouth every 4 hours, As needed, Severe Pain (7 - 10)    Multiple Vitamins oral tablet  -- 1 tab(s) by mouth once a day    isosorbide mononitrate 60 mg oral tablet, extended release  -- 1 tab(s) by mouth once a day    loratadine 10 mg oral tablet  -- 1 tab(s) by mouth once a day    Flomax 0.4 mg oral capsule  -- 1 cap(s) by mouth once a day    finasteride 5 mg oral tablet  -- 1 tab(s) by mouth once a day (at bedtime)    minocycline 100 mg oral capsule  -- 1 cap(s) by mouth every 12 hours    furosemide 40 mg oral tablet  -- 1.5 tab(s) by mouth once a day    Feosol 325 mg (65 mg elemental iron) oral tablet  -- 1 tab(s) by mouth once a day    Coumadin 2.5 mg oral tablet  -- 1 tab(s) by mouth once a day for 2 days. Every third day give 5 mg.

## 2018-09-13 NOTE — CONSULT NOTE ADULT - SUBJECTIVE AND OBJECTIVE BOX
HPI: Mr. Parry is a 89y old Male coming from [facility] with hx of COPD (uses continuous supplemental oxygen), chronic atrial fibrillation on coumadin, valvular heart disease (MR, TR, PI, AI), hypertension, chronic lower extremity edema, recent admission -8/3 for fall resulting intraumatic rib/lumbar fractures, now admitted on 9/10/18 with for increasing cough and SOB, with finding of leukocytosis and ARF on labs at [facility]. Found here to have the same with Cr>3, CT scan showing extensive bl PNA, mod R pleural effusion, and GB distention (cholecystitis later ruled out by US), dx with pneumonia and resultant COPD exacerbation. Course further c/b delirium, and mounting sx of pain and anxiety. Palliative Care consulted to assist with establishing GOC.     Met Mr. Parry this afternoon, family at bedside. Pt altered and unable to participate with interviewer, moving to touch, but unable to follow commands or give verbal responses. Of note, pt in great deal of pain earlier in day, calling out as per RN, despite pain regimen at that time (including dose of IV morphine 2mg and one dose of oxy IR 10mg in past 24h), leading to IV dilaudid dose 1mg, which made pt calm and comfortable. Given this, met with son/HCP1 Randolph and his wife separately to gather more information. See [facility] note for additional details.       PAIN: (x )Yes   ( )No- unable to provide more information due to mental status    DYSPNEA: (x ) Yes  ( ) No- pt unable to endorse, but family and RN endorsing this. No nonverbal signs of dyspnea at this moment  Level:    PAST MEDICAL & SURGICAL HISTORY:  Chronic back pain  COPD (chronic obstructive pulmonary disease)  Kidney disease: since 1 year  Hypothyroidism  Occult GI bleeding  Atrial fibrillation  Diastolic heart failure  Hypertension  History of cataract extraction, unspecified laterality      SOCIAL HX: Lived independently before fall in July,  (wife  of lung ca), 4 children, retired phone company technician    Hx opiate tolerance (x )YES  ( )NO    Baseline ADLs  (Prior to Admission)- walking with walker since last fall, unable to care for wounds any longer due to vertebral fracture   ( ) Independent   (x )Dependent    FAMILY HISTORY:  Unable to gather from pt at this time due to altered mental status      Review of Systems:    Unable to obtain/Limited due to: mental status      PHYSICAL EXAM:    Vital Signs Last 24 Hrs  T(C): 36.3 (13 Sep 2018 04:42), Max: 36.3 (13 Sep 2018 04:42)  T(F): 97.4 (13 Sep 2018 04:42), Max: 97.4 (13 Sep 2018 04:42)  HR: 73 (13 Sep 2018 04:42) (70 - 73)  BP: 110/33 (13 Sep 2018 04:42) (110/33 - 110/33)  BP(mean): --  RR: --  SpO2: 96% (13 Sep 2018 04:42) (96% - 96%)  Daily     Daily     PPSV2: 30  %    General: Elderly male lying in bed, obtunded, moves slightly to touch, but unable to answer questions, appears comfortable  Mental Status: unable to gather  HEENT: dmm, nc in place, +temporal wasting  Lungs: dec bs bl at bases  Cardiac: +s1 s2 irr  GI: soft nt nd +bs  : voids, incontinent  Ext: extensive skin breakdown, redness and peeling over Lower extremities with swelling, moves to touch  Neuro: unable to assess due to mental status, moves to touch so sensations seems intact      LABS:                        7.8    12.05 )-----------( 264      ( 13 Sep 2018 05:59 )             27.1         140  |  107  |  87<H>  ----------------------------<  82  4.3   |  19<L>  |  3.02<H>    Ca    8.7      13 Sep 2018 05:59    TPro  5.3<L>  /  Alb  1.7<L>  /  TBili  0.5  /  DBili  x   /  AST  47<H>  /  ALT  20  /  AlkPhos  112      PT/INR - ( 13 Sep 2018 05:59 )   PT: 20.4 sec;   INR: 1.87 ratio           Albumin: Albumin, Serum: 1.7 g/dL ( @ 05:59)      Allergies  No Known Allergies  Intolerances      MEDICATIONS  (STANDING):  aspirin enteric coated 81 milliGRAM(s) Oral daily  atorvastatin 40 milliGRAM(s) Oral at bedtime  calcium carbonate 1250 mG  + Vitamin D (OsCal 500 + D) 1 Tablet(s) Oral two times a day  docusate sodium 100 milliGRAM(s) Oral three times a day  dorzolamide 2% Ophthalmic Solution 1 Drop(s) Both EYES three times a day  dorzolamide 2% Ophthalmic Solution      doxycycline IVPB 100 milliGRAM(s) IV Intermittent every 12 hours  doxycycline IVPB      finasteride 5 milliGRAM(s) Oral daily  influenza   Vaccine 0.5 milliLiter(s) IntraMuscular once  latanoprost 0.005% Ophthalmic Solution 1 Drop(s) Both EYES at bedtime  levothyroxine 175 MICROGram(s) Oral daily  lidocaine   Patch 1 Patch Transdermal daily  multivitamin 1 Tablet(s) Oral daily  pantoprazole    Tablet 40 milliGRAM(s) Oral before breakfast  piperacillin/tazobactam IVPB. 3.375 Gram(s) IV Intermittent every 12 hours  senna 2 Tablet(s) Oral at bedtime  sodium chloride 0.9%. 2000 milliLiter(s) (100 mL/Hr) IV Continuous <Continuous>  tamsulosin 0.4 milliGRAM(s) Oral at bedtime  warfarin 3 milliGRAM(s) Oral daily    MEDICATIONS  (PRN):  acetaminophen   Tablet .. 650 milliGRAM(s) Oral every 6 hours PRN Temp greater or equal to 38C (100.4F), Mild Pain (1 - 3)  ALBUTerol/ipratropium for Nebulization 3 milliLiter(s) Nebulizer every 4 hours PRN Shortness of Breath and/or Wheezing  HYDROmorphone  Injectable 0.5 milliGRAM(s) IV Push every 2 hours PRN moderate pain and dyspnea  HYDROmorphone  Injectable 1 milliGRAM(s) IV Push every 2 hours PRN severe pain or dyspnea  LORazepam   Injectable 0.5 milliGRAM(s) IV Push every 4 hours PRN anxiety or agitation  oxyCODONE    IR 5 milliGRAM(s) Oral every 4 hours PRN Moderate Pain (4 - 6)  oxyCODONE    IR 10 milliGRAM(s) Oral every 4 hours PRN Severe Pain (7 - 10)      RADIOLOGY/ADDITIONAL STUDIES:    EXAM:  US COMPLETE ABDOMEN SONOGRAM                        PROCEDURE DATE:  2018      IMPRESSION:    Gallstone without biliary dilatation or secondary signs of acute   cholecystitis.    TYRESE GEE   This document has been electronically signed. Sep 12 2018  9:34AM      EXAM:  CT ABDOMEN AND PELVIS                        PROCEDURE DATE:  09/10/2018      Impression:    Extensive bibasilar pneumonia. Right pleural effusion.  Distended gallbladder with cholelithiasis. If warranted correlate with   ultrasound and/or HIDA scan  Markedly enlarged prostate.  Additional findings as discussed.    KIERA RAMIREZ   This document has been electronically signed. Sep 10 2018  8:05PM      EXAM:  XR CHEST PORTABLE IMMED 1V                        PROCEDURE DATE:  09/10/2018      IMPRESSION:  No significant changes in the size of the moderate size right pleural   effusion or the magnitude of atelectatic changes in the inferior right   lung.      JENNA MANUEL M.D., ATTENDING RADIOLOGIST  This document has been electronically signed. Sep 11 2018 10:41AM

## 2018-09-13 NOTE — CONSULT NOTE ADULT - ASSESSMENT
89y old Male coming from [facility] with hx of COPD (uses continuous supplemental oxygen), chronic atrial fibrillation on coumadin, valvular heart disease (MR, TR, PI, AI), hypertension, chronic lower extremity edema, recent admission 7/31-8/3 for fall resulting in traumatic rib/lumbar fractures, now admitted on 9/10/18 with for increasing cough and SOB, with finding of leukocytosis and ARF on labs at [facility]. Found here to have the same with Cr>3, CT scan showing extensive bl PNA, mod R pleural effusion, and GB distention (cholecystitis later ruled out by US), dx with pneumonia and resultant COPD exacerbation. Course further c/b delirium, and mounting sx of pain and anxiety. Palliative Care consulted to assist with establishing GOC.     1) pain  2) Altered Mental status  3) Dyspnea/acute on chronic resp failure  - hx of COPD  - c/w o2 supplemental  -      4) sepsis  - extensive pna noted  - on iv abx  - leukocytosis better today    5) ARF  - cr>3   - monitoring    6) a fib/cardiomyopathy  - cardiology notes appreciated  - cardiomyopathy with valvular diseases  - echo appreciated  - rate control and on coumadin  - impression of multiple clinical predictors of poor outcome    7) debility  - PPS<40%  - pt with multiple falls recently as per family, hospitalization resulting from last fall  - coming from rehab  - signs of poor nutrition with edema, skin breakdown, fat/muscle wasting    8) prognosis  - appears poor  - in light of advanced age, multiple comorbidities, complicated by frequent falls, fractures, COPD with O2 dependence. afib, valvular disease, now with ARF, extensive pneumonia, albumin 1.7, skin breakdown, and delirium. Pt seems appropriate for hospice given family's desire for comfort focus given above.     9) GOC/Advanced Directives  - pt does not currently have capacity for decision making  - 89y old Male coming from [facility] with hx of COPD (uses continuous supplemental oxygen), chronic atrial fibrillation on coumadin, valvular heart disease (MR, TR, PI, AI), hypertension, chronic lower extremity edema, recent admission 7/31-8/3 for fall resulting in traumatic rib/lumbar fractures, now admitted on 9/10/18 with for increasing cough and SOB, with finding of leukocytosis and ARF on labs at [facility]. Found here to have the same with Cr>3, CT scan showing extensive bl PNA, mod R pleural effusion, and GB distention (cholecystitis later ruled out by US), dx with pneumonia and resultant COPD exacerbation. Course further c/b delirium, and mounting sx of pain and anxiety. Palliative Care consulted to assist with establishing GOC.     1) pain  - multiple LE skin wounds   - oxycodone IR 5/10 and gabapentin ordered prn  - dc this as pt is in renal failure and all of this have tendency to accumulate toxic metabolic breakdown products   - IV dilaudid 0.5 mg q2h prn moderate pain or dyspnea  - IV dilaudid 1mg q2h prn severe pain or dyspnea  - plan for comfort care via hospice who will continue with these measures    2) Altered Mental status  - delirium due to metabolic encephalopathy  - added IV ativan 0.5 mg IV q4h prn  - refusing po xanax  - frequent reorientation    3) Dyspnea/acute on chronic resp failure  - hx of COPD  - c/w o2 supplemental  -  extensive PNA noted  - IV dilaudid 0.5 and 1mg prn written to aid with air hunger    4) sepsis  - extensive pna noted  - on iv abx  - leukocytosis better today  - discussed continuation of abx with family who would prefer continuing without this if it allowed pt to go to hospice of their choice and comfort focused care. Agreeable to completion of po course which [facility] also agreed with    5) ARF  - cr>3   - monitoring    6) a fib/cardiomyopathy  - cardiology notes appreciated  - cardiomyopathy with valvular diseases  - echo appreciated  - rate control and on coumadin  - impression of multiple clinical predictors of poor outcome    7) debility  - PPS<40%  - pt with multiple falls recently as per family, hospitalization resulting from last fall  - coming from rehab  - signs of poor nutrition with edema, skin breakdown, fat/muscle wasting    8) prognosis  - appears poor  - in light of advanced age, multiple comorbidities, complicated by frequent falls, fractures, COPD with O2 dependence. afib, valvular disease, now with ARF, extensive pneumonia, albumin 1.7, skin breakdown, and delirium. Pt seems appropriate for hospice given family's desire for comfort focus given above.     9) GOC/Advanced Directives  - pt does not currently have capacity for decision making  - HCP on file noting son Randolph Parry first ([phone]) and 3 other children after this.  - MOLST stating DNR and DNI noted, and form also completed here  - GOC meeting held- family noting pt has been declining with multiple falls, though previously sharp mentally, delirious here though in moments of lucidity asking to be allowed to die peacefully. Family would like to honor this and known wishes via Living Will for the same in such a situation as this, thus wanting [facility] inpt hospice transition today. Accepted and awaiting transfer this evening, floor SW arranging and Dr. Kiran aware. *Spent 50 minutes discussing GOC with family including Advance care planning, explanation and discussion of advance directives, reviewed MOLST, all treatment options and finalized DNR/DNI form. See GOC note for further details.     Thank you for including us in Mr. Parry's care. Will continue to follow with you.    Virginia Cain MD  Palliative Care Attending

## 2018-09-13 NOTE — DISCHARGE NOTE ADULT - PATIENT PORTAL LINK FT
You can access the [facility] Patient Portal, offered by [facility], by registering with the following website: [email or web address]

## 2018-09-13 NOTE — PROGRESS NOTE ADULT - ASSESSMENT
89 y/o Male with h/o A.fib on coumadin, COPD on intermittent home O2, HTN, HLD, CKD stage 3, lumbar compression fractures s/p fall (08/2018) was admitted on 9/11 for cough and SOB. His symptoms have been gradually progressive over the past 2-3 days PTA. Cough is productive for sputum. No associated fever or chills, but has fatigue, anorexia and is reported more confused. [facility] and [facility] to [facility] ED for evaluation. In the ED, patient received cefepime, vancomycin IV x 1.    1. Acute respiratory failure improved. B/l pneumonia. COPD exacerbation. Cholelithiasis. Possible acute cholecystis. BPH. ARF on CRF stage 4. Encephalopathy.  -respiratory function is frail  -leukocytosis improving  -pedal edema improving  -f/u BC x 2, urine c/s  -on zosyn 3.375 gm IV q12h and doxycycline 100 mg IV q12h # 3  -tolerating abx well so far; no side effects noted  -f/u BMP and CBC  -continue abx coverage  -prognosis is poor  -elevate legs  -monitor temps  -supportive care  2. Other issues:   -care per medicine

## 2018-09-13 NOTE — DISCHARGE NOTE ADULT - CARE PLAN
Principal Discharge DX:	Pneumonia  Goal:	clear infection  Assessment and plan of treatment:	complete full course of antibiotics. failure to thrive - inpatient hospice

## 2018-09-13 NOTE — PROGRESS NOTE ADULT - ASSESSMENT
* I discussed case with Dr. Kiran and patient's family (at bedside)  ** D/c telemetry  *** Will f/u as needed  - please call with questions.

## 2018-09-13 NOTE — PROGRESS NOTE ADULT - PROBLEM SELECTOR PLAN 1
Management as per primary service; prognosis is guarded - multiple clinical predictors of poor outcome; patient has advanced directives; palliative care meeting planned.

## 2018-09-13 NOTE — PROGRESS NOTE ADULT - SUBJECTIVE AND OBJECTIVE BOX
Date of service: 18 @ 15:00    Lying in bed in NAD  Lethargic  Weak looking  Dose not seem in pain    ROS: no fever or chills; unobtainable    MEDICATIONS  (STANDING):  aspirin enteric coated 81 milliGRAM(s) Oral daily  atorvastatin 40 milliGRAM(s) Oral at bedtime  calcium carbonate 1250 mG  + Vitamin D (OsCal 500 + D) 1 Tablet(s) Oral two times a day  docusate sodium 100 milliGRAM(s) Oral three times a day  dorzolamide 2% Ophthalmic Solution 1 Drop(s) Both EYES three times a day  dorzolamide 2% Ophthalmic Solution      doxycycline IVPB 100 milliGRAM(s) IV Intermittent every 12 hours  doxycycline IVPB      finasteride 5 milliGRAM(s) Oral daily  influenza   Vaccine 0.5 milliLiter(s) IntraMuscular once  latanoprost 0.005% Ophthalmic Solution 1 Drop(s) Both EYES at bedtime  levothyroxine 175 MICROGram(s) Oral daily  lidocaine   Patch 1 Patch Transdermal daily  multivitamin 1 Tablet(s) Oral daily  pantoprazole    Tablet 40 milliGRAM(s) Oral before breakfast  piperacillin/tazobactam IVPB. 3.375 Gram(s) IV Intermittent every 12 hours  senna 2 Tablet(s) Oral at bedtime  sodium chloride 0.9%. 2000 milliLiter(s) (100 mL/Hr) IV Continuous <Continuous>  tamsulosin 0.4 milliGRAM(s) Oral at bedtime  warfarin 3 milliGRAM(s) Oral daily      Vital Signs Last 24 Hrs  T(C): 36.3 (13 Sep 2018 04:42), Max: 36.3 (13 Sep 2018 04:42)  T(F): 97.4 (13 Sep 2018 04:42), Max: 97.4 (13 Sep 2018 04:42)  HR: 73 (13 Sep 2018 04:42) (70 - 73)  BP: 110/33 (13 Sep 2018 04:42) (110/33 - 110/33)  BP(mean): --  RR: --  SpO2: 96% (13 Sep 2018 04:42) (96% - 96%)    Physical Exam:      Constitutional: frail looking  HEENT: NC/AT, EOMI, PERRLA, conjunctivae clear  Neck: supple; thyroid not palpable  Back: no tenderness  Respiratory: respiratory effort normal; crackles at base  Cardiovascular: S1S2 regular, no murmurs  Abdomen: soft, not tender, not distended, positive BS  Genitourinary: no suprapubic tenderness  Lymphatic: no LN palpable  Musculoskeletal: no muscle tenderness, no joint swelling or tenderness  Extremities: 3+ pedal edema; ankle erythema and scant serous discharge; edema is improving  Neurological/ Psychiatric: confused, moving all extremities  Skin: no rashes; no palpable lesions    Labs: reviewed                        7.8    12.05 )-----------( 264      ( 13 Sep 2018 05:59 )             27.1     -13    140  |  107  |  87<H>  ----------------------------<  82  4.3   |  19<L>  |  3.02<H>    Ca    8.7      13 Sep 2018 05:59    TPro  5.3<L>  /  Alb  1.7<L>  /  TBili  0.5  /  DBili  x   /  AST  47<H>  /  ALT  20  /  AlkPhos  112                                     7.9    26.78 )-----------( 274      ( 11 Sep 2018 06:33 )             27.1     09-    134<L>  |  103  |  82<H>  ----------------------------<  84  5.4<H>   |  21<L>  |  3.12<H>    Ca    8.1<L>      11 Sep 2018 06:33  Phos  5.9     09-11  Mg     2.1     -    TPro  6.1  /  Alb  2.0<L>  /  TBili  0.4  /  DBili  x   /  AST  30  /  ALT  25  /  AlkPhos  111  09-10     LIVER FUNCTIONS - ( 10 Sep 2018 16:17 )  Alb: 2.0 g/dL / Pro: 6.1 gm/dL / ALK PHOS: 111 U/L / ALT: 25 U/L / AST: 30 U/L / GGT: x           Urinalysis Basic - ( 10 Sep 2018 16:17 )    Color: Yellow / Appearance: Clear / S.015 / pH: x  Gluc: x / Ketone: Negative  / Bili: Small / Urobili: Negative mg/dL   Blood: x / Protein: Negative mg/dL / Nitrite: Negative   Leuk Esterase: Trace / RBC: 6-10 /HPF / WBC 6-10   Sq Epi: x / Non Sq Epi: Occasional / Bacteria: Occasional      Culture - Urine (collected 10 Sep 2018 16:17)  Source: .Urine None  Final Report (11 Sep 2018 22:17):    No growth    Culture - Blood (collected 10 Sep 2018 16:17)  Source: .Blood None  Preliminary Report (12 Sep 2018 01:02):    No growth to date.    Culture - Blood (collected 10 Sep 2018 16:17)  Source: .Blood None  Preliminary Report (12 Sep 2018 01:02):    No growth to date.          Radiology: all available radiological tests reviewed    < from: CT Abdomen and Pelvis No Cont (09.10.18 @ 19:20) >  Extensive bibasilar pneumonia. Right pleural effusion.  Distended gallbladder with cholelithiasis. If warranted correlate with   ultrasound and/or HIDA scan  Markedly enlarged prostate.    < end of copied text >      Advanced directives addressed: full resuscitation

## 2018-09-13 NOTE — GOALS OF CARE CONVERSATION - PERSONAL ADVANCE DIRECTIVE - WHAT MATTERS MOST
Pt's comfort, they note all siblings have discussed this, are in route to be with him, and are in agreement to allow pt his right to die peacefully with a comfort focus.

## 2018-09-13 NOTE — GOALS OF CARE CONVERSATION - PERSONAL ADVANCE DIRECTIVE - CONVERSATION DETAILS
Met with pt's family to discuss medical issues and overall GOC. 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 He was living alone with only occasional visits from wound care nurses to help with the wrapping of his legs for chronic edema up until July. His wife  of lung cancer, which was a hard thing for pt and family to watch as she suffered at the end of her life. This caused a great deal of anxiety for pt who has COPD and experiences dyspnea at home at times and certainly in the past few weeks. He is a proud man who valued his independence, but was waning over past few months functionally. They explained he had few falls at home and falling came to a head in July when he fell upon leaving a car dealership. He initially thought he was ok, but ended up with increasing back and side pain and no longer was able to wrap his own legs, causing further skin breakdown. They eventually took him to the hospital with known findings of traumatic lumbar spine fracture and rib fractures. He then went to [facility] where he was doing ok, but ended up with more skin tears due to the friability of his skin and worsening respiratory symptoms that eventually led him here. Family, including son and daughter-in-law who are present today and his 3 other children, some of whom are on their way from out of state, love pt dearly and do not want to seem him suffering as he has. His son noted not enjoying watching his father decline over the past few months, but being grateful that he recently retired so he could be around to support him. They are most concerned about preventing further suffering, open to help in figuring out how to do so.     Family was welll aware of all medical issues including pt's COPD, O2 dependence, cardiac issues including a fib, valvulopathy, le edema, falls, extensive pneumonia, arf, and altered mental status which they know is due to delirium. They note that pt has lucid moments in between episodes of delirium, where he can remember being confused seeing his dead wife, and asking his son how he has the strength to watch another parent die. They note that pt had clear advanced directives, naming his proxies and also clarifying how he wanted to leave this world if his life was ending. He knew he is dying and was at peace with it in those moments, which gave family a gift in knowing this. He did not want to suffer with pain and especially not dyspnea as he was so afraid of this due to watching his wife die with it.     As a result of these shared sentiments we discussed options to either wait and see how treatments worked acutely or focus on comfort, the latter seeming more consistent with the expressed wishes of the pt both recently and in [place]. They noted how much they would love for pt to improve and get back to himself, but that even he knew that would only be temporary as he was more likely to have another fall or complication from his underlying chronic progressive comorbidities. Thus they opted for full comfort measures via hospice, initially including no further IVF given his 3rd spacing, no blood draws, no more imaging, and ok for dc'ing abx as agency that they preferred ([facility] where pt's wife was buried which they took as a perfect place for him as a result) usually does not continue with IV abx. Last point prompted hospitalist to implore family to allow completion of oral abx course to treat pneumonia, which they were on the fence about, but since [facility] agreed to this they allowed it. Hence, everyone was in agreement with plan and transfer to [facility] in hospice today, floor SW already arranging this and agency accepting pt and having a bed available to accommodate pt.     Above discussed with Dr. Kiran, RN, and floor SW.

## 2018-09-13 NOTE — DISCHARGE NOTE ADULT - VISION (WITH CORRECTIVE LENSES IF THE PATIENT USUALLY WEARS THEM):
wears glasses for reading and distance/Normal vision: sees adequately in most situations; can see medication labels, newsprint

## 2018-09-13 NOTE — DISCHARGE NOTE ADULT - HOSPITAL COURSE
H Plasty Text: Given the location of the defect, shape of the defect and the proximity to free margins a H-plasty was deemed most appropriate for repair.  Using a sterile surgical marker, the appropriate advancement arms of the H-plasty were drawn incorporating the defect and placing the expected incisions within the relaxed skin tension lines where possible. The area thus outlined was incised deep to adipose tissue with a #15 scalpel blade. The skin margins were undermined to an appropriate distance in all directions utilizing iris scissors.  The opposing advancement arms were then advanced into place in opposite direction and anchored with interrupted buried subcutaneous sutures. 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  pt doing poorly despite medical therapy and family requested palliative eval.  as per HCP and patients known wishes plan for inpatient hospice with plan to complete short course of po abx at hospice.   Vital Signs Last 24 Hrs  T(C): 36.3 (13 Sep 2018 04:42), Max: 36.3 (13 Sep 2018 04:42)  T(F): 97.4 (13 Sep 2018 04:42), Max: 97.4 (13 Sep 2018 04:42)  HR: 73 (13 Sep 2018 04:42) (70 - 73)  BP: 110/33 (13 Sep 2018 04:42) (110/33 - 110/33)  BP(mean): --  RR: --  SpO2: 96% (13 Sep 2018 04:42) (96% - 96%)    GEN: ill appearing  RESP:   rhonchi b/l   neuro aao x 1  # Acute respiratory failure. B/l pneumonia. COPD exacerbation.  - change to po ceftin for 5 more days  #CALLIE - very slow improvement in creatnine    dispo - hospice   total time 45 mins

## 2018-09-16 LAB
CULTURE RESULTS: SIGNIFICANT CHANGE UP
CULTURE RESULTS: SIGNIFICANT CHANGE UP
SPECIMEN SOURCE: SIGNIFICANT CHANGE UP
SPECIMEN SOURCE: SIGNIFICANT CHANGE UP

## 2018-09-18 DIAGNOSIS — A41.9 SEPSIS, UNSPECIFIED ORGANISM: ICD-10-CM

## 2018-09-18 DIAGNOSIS — I13.0 HYPERTENSIVE HEART AND CHRONIC KIDNEY DISEASE WITH HEART FAILURE AND STAGE 1 THROUGH STAGE 4 CHRONIC KIDNEY DISEASE, OR UNSPECIFIED CHRONIC KIDNEY DISEASE: ICD-10-CM

## 2018-09-18 DIAGNOSIS — J96.20 ACUTE AND CHRONIC RESPIRATORY FAILURE, UNSPECIFIED WHETHER WITH HYPOXIA OR HYPERCAPNIA: ICD-10-CM

## 2018-09-18 DIAGNOSIS — N40.0 BENIGN PROSTATIC HYPERPLASIA WITHOUT LOWER URINARY TRACT SYMPTOMS: ICD-10-CM

## 2018-09-18 DIAGNOSIS — J44.1 CHRONIC OBSTRUCTIVE PULMONARY DISEASE WITH (ACUTE) EXACERBATION: ICD-10-CM

## 2018-09-18 DIAGNOSIS — G93.41 METABOLIC ENCEPHALOPATHY: ICD-10-CM

## 2018-09-18 DIAGNOSIS — E03.9 HYPOTHYROIDISM, UNSPECIFIED: ICD-10-CM

## 2018-09-18 DIAGNOSIS — Z79.82 LONG TERM (CURRENT) USE OF ASPIRIN: ICD-10-CM

## 2018-09-18 DIAGNOSIS — K80.20 CALCULUS OF GALLBLADDER WITHOUT CHOLECYSTITIS WITHOUT OBSTRUCTION: ICD-10-CM

## 2018-09-18 DIAGNOSIS — Z79.01 LONG TERM (CURRENT) USE OF ANTICOAGULANTS: ICD-10-CM

## 2018-09-18 DIAGNOSIS — E78.5 HYPERLIPIDEMIA, UNSPECIFIED: ICD-10-CM

## 2018-09-18 DIAGNOSIS — I08.8 OTHER RHEUMATIC MULTIPLE VALVE DISEASES: ICD-10-CM

## 2018-09-18 DIAGNOSIS — J15.6 PNEUMONIA DUE TO OTHER GRAM-NEGATIVE BACTERIA: ICD-10-CM

## 2018-09-18 DIAGNOSIS — L97.919 NON-PRESSURE CHRONIC ULCER OF UNSPECIFIED PART OF RIGHT LOWER LEG WITH UNSPECIFIED SEVERITY: ICD-10-CM

## 2018-09-18 DIAGNOSIS — R35.0 FREQUENCY OF MICTURITION: ICD-10-CM

## 2018-09-18 DIAGNOSIS — L03.119 CELLULITIS OF UNSPECIFIED PART OF LIMB: ICD-10-CM

## 2018-09-18 DIAGNOSIS — I48.2 CHRONIC ATRIAL FIBRILLATION: ICD-10-CM

## 2018-09-18 DIAGNOSIS — I50.32 CHRONIC DIASTOLIC (CONGESTIVE) HEART FAILURE: ICD-10-CM

## 2018-09-18 DIAGNOSIS — I44.7 LEFT BUNDLE-BRANCH BLOCK, UNSPECIFIED: ICD-10-CM

## 2018-09-18 DIAGNOSIS — J44.0 CHRONIC OBSTRUCTIVE PULMONARY DISEASE WITH (ACUTE) LOWER RESPIRATORY INFECTION: ICD-10-CM

## 2018-09-18 DIAGNOSIS — I83.009 VARICOSE VEINS OF UNSPECIFIED LOWER EXTREMITY WITH ULCER OF UNSPECIFIED SITE: ICD-10-CM

## 2018-09-18 DIAGNOSIS — Z66 DO NOT RESUSCITATE: ICD-10-CM

## 2018-09-18 DIAGNOSIS — N18.4 CHRONIC KIDNEY DISEASE, STAGE 4 (SEVERE): ICD-10-CM

## 2018-09-18 DIAGNOSIS — G89.29 OTHER CHRONIC PAIN: ICD-10-CM

## 2018-09-18 DIAGNOSIS — Z51.5 ENCOUNTER FOR PALLIATIVE CARE: ICD-10-CM

## 2018-09-18 DIAGNOSIS — L97.929 NON-PRESSURE CHRONIC ULCER OF UNSPECIFIED PART OF LEFT LOWER LEG WITH UNSPECIFIED SEVERITY: ICD-10-CM

## 2018-09-18 DIAGNOSIS — Z99.81 DEPENDENCE ON SUPPLEMENTAL OXYGEN: ICD-10-CM

## 2018-09-18 DIAGNOSIS — I42.9 CARDIOMYOPATHY, UNSPECIFIED: ICD-10-CM

## 2018-09-18 DIAGNOSIS — D64.9 ANEMIA, UNSPECIFIED: ICD-10-CM

## 2019-10-04 NOTE — ED STATDOCS - PRO INTERPRETER NEED 2
Patient Education        Sickle Cell Crisis: Care Instructions  Your Care Instructions    Sickle cell crisis is a painful episode that may begin suddenly in a person with sickle cell disease. Sickle cell disease turns normal, round red blood cells into cells that look like roopa or crescent moons. The sickle-shaped cells can get stuck in blood vessels, blocking blood flow and causing severe pain. The pain can occur in the bones of the spine, the arms and legs, the chest, and the abdomen. An episode may be called a \"painful event\" or \"painful crisis. \" Some people who have sickle cell disease have many painful events, while others have few or none. Treatment depends on the level of pain and how long it lasts. Sometimes taking nonprescription pain relievers can help. Or you may need stronger pain relief medicine that is prescribed or given by a doctor. You may need to be treated in the hospital.  It isn't always possible to know what sets off a painful event. But triggers include being dehydrated, cold temperatures, infection, stress, and not getting enough oxygen. Follow-up care is a key part of your treatment and safety. Be sure to make and go to all appointments, and call your doctor if you are having problems. It's also a good idea to know your test results and keep a list of the medicines you take. How can you care for yourself at home? · Create a pain management plan with your doctor. This plan should include the types of medicines you can take and other actions you can take at home to relieve pain. · Drink plenty of fluids, enough so that your urine is light yellow or clear like water. If you have kidney, heart, or liver disease and have to limit fluids, talk with your doctor before you increase the amount of fluids you drink. · Take your medicines exactly as prescribed. Call your doctor if you think you are having a problem with your medicine. · Take pain medicines exactly as directed.   ? If the doctor gave you a prescription medicine for pain, take it as prescribed. ? If you are not taking a prescription pain medicine, ask your doctor if you can take an over-the-counter medicine. · Avoid alcohol. It can make you dehydrated. · Dress warmly in cold weather. The cold and windy weather can lead to severe pain. · Do not smoke. Smoking can reduce the amount of oxygen in your blood. · Get plenty of sleep. When should you call for help? Call 911 anytime you think you may need emergency care. For example, call if:    · You have symptoms of a severe problem from sickle cell.     · You have symptoms of a stroke. These may include:  ? Sudden numbness, tingling, weakness, or loss of movement in your face, arm, or leg, especially on only one side of your body. ? Sudden vision changes. ? Sudden trouble speaking. ? Sudden confusion or trouble understanding simple statements. ? Sudden problems with walking or balance. ? A sudden, severe headache that is different from past headaches.     · You are in severe pain.     · You have symptoms of a heart attack. These may include:  ? Chest pain or pressure, or a strange feeling in the chest.  ? Sweating. ? Shortness of breath. ? Nausea or vomiting. ? Pain, pressure, or a strange feeling in the back, neck, jaw, or upper belly or in one or both shoulders or arms. ? Lightheadedness or sudden weakness. ? A fast or irregular heartbeat. After you call 911, the  may tell you to chew 1 adult-strength or 2 to 4 low-dose aspirin. Wait for an ambulance. Do not try to drive yourself.    Call your doctor now or seek immediate medical care if:    · You have a fever.    Watch closely for changes in your health, and be sure to contact your doctor if you have any problems. Where can you learn more? Go to http://laruie-jung.info/. Enter F104 in the search box to learn more about \"Sickle Cell Crisis: Care Instructions. \"  Current as of: March 28, 2019  Content Version: 12.2  © 3809-8548 [employer]. Care instructions adapted under license by [employer] (which disclaims liability or warranty for this information). If you have questions about a medical condition or this instruction, always ask your healthcare professional. [address] any warranty or liability for your use of this information. English

## 2019-11-14 NOTE — ED PROVIDER NOTE - DURATION
today Consent (Nose)/Introductory Paragraph: The rationale for Mohs was explained to the patient and consent was obtained. The risks, benefits and alternatives to therapy were discussed in detail. Specifically, the risks of nasal deformity, changes in the flow of air through the nose, infection, scarring, bleeding, prolonged wound healing, incomplete removal, allergy to anesthesia, nerve injury and recurrence were addressed. Prior to the procedure, the treatment site was clearly identified and confirmed by the patient. All components of Universal Protocol/PAUSE Rule completed.

## 2023-02-20 NOTE — CONSULT NOTE ADULT - CONSULT REQUESTED BY NAME
Dr. Garza Azithromycin Pregnancy And Lactation Text: This medication is considered safe during pregnancy and is also secreted in breast milk.

## 2024-03-14 NOTE — ED ADULT NURSE NOTE - PAIN RATING/NUMBER SCALE (0-10): REST
Spoke with Brea (MANUEL)    Informed her of message and instruction below    No further questions   0